# Patient Record
Sex: MALE | Race: WHITE | Employment: OTHER | URBAN - METROPOLITAN AREA
[De-identification: names, ages, dates, MRNs, and addresses within clinical notes are randomized per-mention and may not be internally consistent; named-entity substitution may affect disease eponyms.]

---

## 2018-08-06 ENCOUNTER — APPOINTMENT (OUTPATIENT)
Dept: GENERAL RADIOLOGY | Age: 68
End: 2018-08-06
Payer: MEDICARE

## 2018-08-06 ENCOUNTER — HOSPITAL ENCOUNTER (EMERGENCY)
Age: 68
Discharge: HOME | End: 2018-08-06
Payer: MEDICARE

## 2018-08-06 VITALS
DIASTOLIC BLOOD PRESSURE: 89 MMHG | TEMPERATURE: 98.3 F | OXYGEN SATURATION: 93 % | HEART RATE: 91 BPM | BODY MASS INDEX: 30.13 KG/M2 | RESPIRATION RATE: 18 BRPM | WEIGHT: 210 LBS | SYSTOLIC BLOOD PRESSURE: 156 MMHG

## 2018-08-06 DIAGNOSIS — J20.9 ACUTE BRONCHITIS, UNSPECIFIED ORGANISM: Primary | ICD-10-CM

## 2018-08-06 PROCEDURE — 71046 X-RAY EXAM CHEST 2 VIEWS: CPT

## 2018-08-06 PROCEDURE — 99212 OFFICE O/P EST SF 10 MIN: CPT

## 2018-08-06 RX ORDER — ALBUTEROL SULFATE 90 UG/1
2 AEROSOL, METERED RESPIRATORY (INHALATION) EVERY 6 HOURS PRN
Qty: 1 INHALER | Refills: 0 | Status: SHIPPED | OUTPATIENT
Start: 2018-08-06 | End: 2021-10-29

## 2018-08-06 RX ORDER — PREDNISONE 20 MG/1
TABLET ORAL
Qty: 8 TABLET | Refills: 0 | Status: SHIPPED | OUTPATIENT
Start: 2018-08-06 | End: 2021-10-06

## 2018-08-06 RX ORDER — GUAIFENESIN AND DEXTROMETHORPHAN HYDROBROMIDE 1200; 60 MG/1; MG/1
1 TABLET, EXTENDED RELEASE ORAL EVERY 12 HOURS PRN
Qty: 12 TABLET | Refills: 0 | Status: SHIPPED | OUTPATIENT
Start: 2018-08-06 | End: 2021-10-29

## 2018-08-06 RX ORDER — DOXYCYCLINE 100 MG/1
100 CAPSULE ORAL 2 TIMES DAILY
Qty: 14 CAPSULE | Refills: 0 | Status: SHIPPED | OUTPATIENT
Start: 2018-08-06 | End: 2018-08-13

## 2018-08-06 NOTE — ED PROVIDER NOTES
This is a 62-year-old male that presents to urgent care mostly complaint of chest congestion and a cough for the past 3 days. There's been no fever or chills. Denies abdominal pain nausea vomiting diarrhea urinary symptoms. Denies any swelling in the legs. Review of Systems   Constitutional:        Pertinent positives and negatives are stated within HPI, all other systems reviewed and are negative. Physical Exam   Constitutional: He is oriented to person, place, and time. He appears well-developed and well-nourished. HENT:   Head: Normocephalic and atraumatic. Right Ear: Hearing, tympanic membrane, external ear and ear canal normal.   Left Ear: Hearing, tympanic membrane, external ear and ear canal normal.   Nose: Nose normal. Right sinus exhibits no maxillary sinus tenderness and no frontal sinus tenderness. Left sinus exhibits no maxillary sinus tenderness and no frontal sinus tenderness. Mouth/Throat: Uvula is midline, oropharynx is clear and moist and mucous membranes are normal. No trismus in the jaw. No uvula swelling. Eyes: Conjunctivae, EOM and lids are normal. Pupils are equal, round, and reactive to light. Neck: Normal range of motion. Neck supple. Cardiovascular: Normal rate, regular rhythm and normal heart sounds. No murmur heard. Pulmonary/Chest: Effort normal. He has wheezes (mild expiratory). Abdominal: Soft. Bowel sounds are normal. There is no tenderness. There is no rigidity, no rebound, no guarding and no CVA tenderness. Musculoskeletal: He exhibits no edema. Neurological: He is alert and oriented to person, place, and time. He has normal strength. No cranial nerve deficit or sensory deficit. Coordination and gait normal. GCS eye subscore is 4. GCS verbal subscore is 5. GCS motor subscore is 6. Skin: Skin is warm and dry. No abrasion and no rash noted. Nursing note and vitals reviewed.       Procedures    MDM --------------------------------------------- PAST HISTORY ---------------------------------------------  Past Medical History:  has a past medical history of Abdominal aneurysm ([facility] Atrial fibrillation ([facility] CHF (congestive heart failure) ([facility] History of cardiovascular stress test; Hypertension; Obstructive chronic bronchitis with exacerbation ([facility] Pneumonia; and Tobacco dependence. Past Surgical History:  has a past surgical history that includes Inner ear surgery; Pilonidal cyst excision ([id number]); and Cardiac catheterization (4/17/2014). Social History:  reports that he has quit smoking. His smoking use included Cigarettes. He has a 4.00 pack-year smoking history. He has never used smokeless tobacco. He reports that he does not drink alcohol or use drugs. Family History: family history is not on file. The patients home medications have been reviewed. Allergies: Carvedilol and Augmentin [amoxicillin-pot clavulanate]    -------------------------------------------------- RESULTS -------------------------------------------------  No results found for this visit on 08/06/18. XR CHEST STANDARD (2 VW)   Final Result   No acute cardiopulmonary disease.             ------------------------- NURSING NOTES AND VITALS REVIEWED ---------------------------   The nursing notes within the ED encounter and vital signs as below have been reviewed. BP (!) 156/89   Pulse 91   Temp 98.3 °F (36.8 °C) (Oral)   Resp 18   Wt 210 lb (95.3 kg)   SpO2 93%   BMI 30.13 kg/m²   Oxygen Saturation Interpretation: Normal      ------------------------------------------ PROGRESS NOTES ------------------------------------------   I have spoken with the patient and discussed todays results, in addition to providing specific details for the plan of care and counseling regarding the diagnosis and prognosis.   Their questions are answered at this time and they are agreeable with the

## 2021-08-23 ENCOUNTER — TELEPHONE (OUTPATIENT)
Dept: AUDIOLOGY | Age: 71
End: 2021-08-23

## 2021-08-23 NOTE — TELEPHONE ENCOUNTER
Patient needs to establish care with ENT. Was patient of Dr Clement Youssef.   Contact number:  [phone]

## 2021-09-10 ENCOUNTER — OFFICE VISIT (OUTPATIENT)
Dept: ENT CLINIC | Age: 71
End: 2021-09-10
Payer: MEDICARE

## 2021-09-10 ENCOUNTER — PROCEDURE VISIT (OUTPATIENT)
Dept: AUDIOLOGY | Age: 71
End: 2021-09-10

## 2021-09-10 VITALS
WEIGHT: 207.6 LBS | SYSTOLIC BLOOD PRESSURE: 125 MMHG | DIASTOLIC BLOOD PRESSURE: 81 MMHG | BODY MASS INDEX: 29.72 KG/M2 | HEART RATE: 73 BPM | HEIGHT: 70 IN

## 2021-09-10 DIAGNOSIS — H92.10 OTORRHEA, UNSPECIFIED LATERALITY: ICD-10-CM

## 2021-09-10 DIAGNOSIS — H90.3 BILATERAL SENSORINEURAL HEARING LOSS: ICD-10-CM

## 2021-09-10 DIAGNOSIS — H65.21 RIGHT CHRONIC SEROUS OTITIS MEDIA: Primary | ICD-10-CM

## 2021-09-10 DIAGNOSIS — H65.00 ACUTE SEROUS OTITIS MEDIA, RECURRENCE NOT SPECIFIED, UNSPECIFIED LATERALITY: ICD-10-CM

## 2021-09-10 PROCEDURE — 99024 POSTOP FOLLOW-UP VISIT: CPT | Performed by: AUDIOLOGIST

## 2021-09-10 PROCEDURE — 4040F PNEUMOC VAC/ADMIN/RCVD: CPT | Performed by: OTOLARYNGOLOGY

## 2021-09-10 PROCEDURE — 1036F TOBACCO NON-USER: CPT | Performed by: OTOLARYNGOLOGY

## 2021-09-10 PROCEDURE — G8417 CALC BMI ABV UP PARAM F/U: HCPCS | Performed by: OTOLARYNGOLOGY

## 2021-09-10 PROCEDURE — 99204 OFFICE O/P NEW MOD 45 MIN: CPT | Performed by: OTOLARYNGOLOGY

## 2021-09-10 PROCEDURE — G8427 DOCREV CUR MEDS BY ELIG CLIN: HCPCS | Performed by: OTOLARYNGOLOGY

## 2021-09-10 PROCEDURE — 1123F ACP DISCUSS/DSCN MKR DOCD: CPT | Performed by: OTOLARYNGOLOGY

## 2021-09-10 PROCEDURE — 3017F COLORECTAL CA SCREEN DOC REV: CPT | Performed by: OTOLARYNGOLOGY

## 2021-09-10 RX ORDER — AZELASTINE 1 MG/ML
2 SPRAY, METERED NASAL 2 TIMES DAILY
Qty: 30 ML | Refills: 1 | Status: SHIPPED | OUTPATIENT
Start: 2021-09-10

## 2021-09-10 RX ORDER — CIPROFLOXACIN AND DEXAMETHASONE 3; 1 MG/ML; MG/ML
4 SUSPENSION/ DROPS AURICULAR (OTIC) 2 TIMES DAILY
Qty: 7.5 ML | Refills: 3 | Status: SHIPPED | OUTPATIENT
Start: 2021-09-10 | End: 2021-09-17

## 2021-09-10 NOTE — PROGRESS NOTES
[facility] Otolaryngology  Dr. Dg Helms. Ms.Ed        Patient Name:  Joselin Ray  :  1950     CHIEF C/O:    Chief Complaint   Patient presents with    Ear Problem     right ear bleeding - plugged for 1.5 months  and today . was given antibiotic and also cleared today all plugged up again. it's the only good ear        HISTORY OBTAINED FROM:  patient    HISTORY OF PRESENT ILLNESS:       Jamaica Haq is a 66y.o. 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 Patient had some leftover drops from his previous ENT that he has attempted to use, without any success. Patient denies any room spinning vertigo. No complaints of nasal congestion, recent history of fever chills sore throat difficulty swallowing at this time.       Past Medical History:   Diagnosis Date    Abdominal aneurysm ([address]     Atrial fibrillation (HCC)     CHF [facility]congestive heart failure) ([facility]) Echo 2/15/14     EF 97% prob diastolic    History of cardiovascular stress test 14    lexiscan    Hypertension     Obstructive chronic bronchitis with exacerbation ([address] 2014    Pneumonia     Tobacco dependence 4/10/2014     Past Surgical History:   Procedure Laterality Date    CARDIAC CATHETERIZATION  2014    By Dr. Fernandez Mayer  's-'s       Current Outpatient Medications:     ciprofloxacin-dexamethasone (CIPRODEX) 0.3-0.1 % otic suspension, Place 4 drops in ear(s) 2 times daily for 7 days, Disp: 7.5 mL, Augmentin [amoxicillin-pot clavulanate]  Social History     Tobacco Use    Smoking status: Former Smoker     Packs/day: 0.10     Years: 40.00     Pack years: 4.00     Types: Cigarettes    Smokeless tobacco: Never Used    Tobacco comment: occassionally a cigarette   Substance Use Topics    Alcohol use: Yes     Alcohol/week: 2.0 standard drinks     Types: 2 Cans of beer per week     Comment: maybe 2-3 beers a year    Drug use: No     No family history on file. Review of Systems   Constitutional: Negative for chills and fever. HENT: Positive for ear discharge, ear pain and hearing loss. Negative for congestion, rhinorrhea, sinus pressure, sinus pain and sneezing. Respiratory: Negative for cough and shortness of breath. Cardiovascular: Negative for chest pain and palpitations. Gastrointestinal: Negative for vomiting. Skin: Negative for rash. Allergic/Immunologic: Negative for environmental allergies. Neurological: Negative for dizziness and headaches. Hematological: Does not bruise/bleed easily. All other systems reviewed and are negative. /81 (Site: Left Upper Arm, Position: Sitting, Cuff Size: Large Adult)   Pulse 73   Ht 5' 10\" (1.778 m)   Wt 207 lb 9.6 oz (94.2 kg)   BMI 29.79 kg/m²   Physical Exam  Vitals and nursing note reviewed. Constitutional:       Appearance: He is well-developed. HENT:      Head: Normocephalic and atraumatic. Right Ear: Decreased hearing noted. A middle ear effusion is present. A PE tube is present. Tympanic membrane is perforated. Left Ear: No PE tube. Tympanic membrane is not retracted or bulging. Tympanic membrane has normal mobility. Eyes:      Pupils: Pupils are equal, round, and reactive to light. Neck:      Thyroid: No thyromegaly. Trachea: No tracheal deviation. Cardiovascular:      Rate and Rhythm: Normal rate. Pulmonary:      Effort: Pulmonary effort is normal. No respiratory distress.    Musculoskeletal:

## 2021-09-12 ASSESSMENT — ENCOUNTER SYMPTOMS
VOMITING: 0
COUGH: 0
RHINORRHEA: 0
SINUS PAIN: 0
SINUS PRESSURE: 0
SHORTNESS OF BREATH: 0

## 2021-09-24 ENCOUNTER — OFFICE VISIT (OUTPATIENT)
Dept: ENT CLINIC | Age: 71
End: 2021-09-24
Payer: MEDICARE

## 2021-09-24 ENCOUNTER — PROCEDURE VISIT (OUTPATIENT)
Dept: AUDIOLOGY | Age: 71
End: 2021-09-24
Payer: MEDICARE

## 2021-09-24 VITALS
SYSTOLIC BLOOD PRESSURE: 135 MMHG | WEIGHT: 207 LBS | HEIGHT: 70 IN | BODY MASS INDEX: 29.63 KG/M2 | DIASTOLIC BLOOD PRESSURE: 78 MMHG

## 2021-09-24 DIAGNOSIS — H65.90 FLUID COLLECTION OF MIDDLE EAR: ICD-10-CM

## 2021-09-24 DIAGNOSIS — H72.01 CENTRAL PERFORATION OF TYMPANIC MEMBRANE OF RIGHT EAR: Primary | ICD-10-CM

## 2021-09-24 PROCEDURE — 1036F TOBACCO NON-USER: CPT | Performed by: OTOLARYNGOLOGY

## 2021-09-24 PROCEDURE — 1123F ACP DISCUSS/DSCN MKR DOCD: CPT | Performed by: OTOLARYNGOLOGY

## 2021-09-24 PROCEDURE — G8427 DOCREV CUR MEDS BY ELIG CLIN: HCPCS | Performed by: OTOLARYNGOLOGY

## 2021-09-24 PROCEDURE — 4040F PNEUMOC VAC/ADMIN/RCVD: CPT | Performed by: OTOLARYNGOLOGY

## 2021-09-24 PROCEDURE — 3017F COLORECTAL CA SCREEN DOC REV: CPT | Performed by: OTOLARYNGOLOGY

## 2021-09-24 PROCEDURE — 99213 OFFICE O/P EST LOW 20 MIN: CPT | Performed by: OTOLARYNGOLOGY

## 2021-09-24 PROCEDURE — G8417 CALC BMI ABV UP PARAM F/U: HCPCS | Performed by: OTOLARYNGOLOGY

## 2021-09-24 PROCEDURE — 92567 TYMPANOMETRY: CPT | Performed by: AUDIOLOGIST

## 2021-09-24 ASSESSMENT — ENCOUNTER SYMPTOMS
COUGH: 0
RHINORRHEA: 0
SINUS PAIN: 0
SINUS PRESSURE: 0
SHORTNESS OF BREATH: 0
VOMITING: 0

## 2021-09-24 NOTE — PROGRESS NOTES
[facility] Otolaryngology  Dr. Amber Yepez. Rajesh Mcnally. Ms.Ed        Patient Name:  Tyree Porter  :  1950     CHIEF C/O:    Chief Complaint   Patient presents with    Follow-up     2 wk ETD       HISTORY OBTAINED FROM:  patient    HISTORY OF PRESENT ILLNESS:       Ventura Sullivan is a 66y.o. 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 Patient had some leftover drops from his previous ENT that he has attempted to use, without any success. Patient denies any room spinning vertigo. No complaints of nasal congestion, recent history of fever chills sore throat difficulty swallowing at this time. 21: here for follow up after being placed on drops for the right ear. Feeling much better denies drainage or pain on that side.        Past Medical History:   Diagnosis Date    Abdominal aneurysm ([address]     Atrial fibrillation (HCC)     CHF (congestive heart failure) (HCC) Echo 2/15/14     EF 92% prob diastolic    History of cardiovascular stress test 14    lexiscan    Hypertension     Obstructive chronic bronchitis with exacerbation ([address] 2014    Pneumonia     Tobacco dependence 4/10/2014     Past Surgical History:   Procedure Laterality Date    CARDIAC CATHETERIZATION  2014    By Dr. Cabrera Levels  's-       Current Outpatient Medications:     chlorthalidone (HYGROTON) 25 MG tablet, Take 25 mg by mouth daily, Disp: , Rfl:     Multiple Vitamins-Minerals (THERAPEUTIC MULTIVITAMIN-MINERALS) tablet, Take 1 tablet by mouth daily, Disp: , Rfl:     warfarin (COUMADIN) 3 MG tablet, Take 3 mg by mouth daily Takes 3 mg for two days, then 1.5 mg the next day. Then he takes 3 mg for two more days then takes 1.5 mg again. Took 3 mg on 4/22/17 so he is due to take 3 mg tonight 4/23/17 then 1.5 mg on 4/24/17., Disp: , Rfl:     furosemide (LASIX) 20 MG tablet, Take 1 tablet by mouth daily. (Patient taking differently:  Take 20 mg by mouth daily as needed ), Disp: 60 tablet, Rfl: 1    azelastine (ASTELIN) 0.1 % nasal spray, 2 sprays by Nasal route 2 times daily Use in each nostril as directed (Patient not taking: Reported on 9/24/2021), Disp: 30 mL, Rfl: 1    albuterol sulfate HFA (PROVENTIL HFA) 108 (90 Base) MCG/ACT inhaler, Inhale 2 puffs into the lungs every 6 hours as needed for Wheezing (Patient not taking: Reported on 9/24/2021), Disp: 1 Inhaler, Rfl: 0    predniSONE (DELTASONE) 20 MG tablet, Take 2 tablets by mouth daily x 4 days. (Patient not taking: Reported on 9/24/2021), Disp: 8 tablet, Rfl: 0    Dextromethorphan-Guaifenesin (MUCINEX DM MAXIMUM STRENGTH)  MG TB12, Take 1 tablet by mouth every 12 hours as needed (cough) (Patient not taking: Reported on 9/24/2021), Disp: 12 tablet, Rfl: 0    loratadine (CLARITIN) 10 MG tablet, Take 10 mg by mouth daily as needed (Patient not taking: Reported on 9/24/2021), Disp: , Rfl:     atorvastatin (LIPITOR) 40 MG tablet, Take 40 mg by mouth daily (Patient not taking: Reported on 9/24/2021), Disp: , Rfl:     metoprolol (TOPROL-XL) 25 MG XL tablet, Take 25 mg by mouth daily (Patient not taking: Reported on 9/24/2021), Disp: , Rfl:     aspirin 81 MG tablet, Take 81 mg by mouth daily. (Patient not taking: Reported on 9/24/2021), Disp: , Rfl:     budesonide (PULMICORT) 0.5 MG/2ML nebulizer suspension, Take 2 mLs by nebulization 2 times daily.  (Patient not taking: Reported on 9/24/2021), Disp: 60 ampule, Rfl: 3    furosemide (LASIX) 20 MG tablet, Take 1 tablet by mouth daily. (Patient not taking: Reported on 9/24/2021), Disp: 30 tablet, Rfl: 6    lisinopril (PRINIVIL;ZESTRIL) 5 MG tablet, Take 2 tablets by mouth daily. (Patient not taking: Reported on 9/24/2021), Disp: 30 tablet, Rfl: 1  Carvedilol and Augmentin [amoxicillin-pot clavulanate]  Social History     Tobacco Use    Smoking status: Former Smoker     Packs/day: 0.10     Years: 40.00     Pack years: 4.00     Types: Cigarettes    Smokeless tobacco: Never Used    Tobacco comment: occassionally a cigarette   Substance Use Topics    Alcohol use: Yes     Alcohol/week: 2.0 standard drinks     Types: 2 Cans of beer per week     Comment: maybe 2-3 beers a year    Drug use: No     No family history on file. Review of Systems   Constitutional: Negative for chills and fever. HENT: Positive for ear discharge, ear pain and hearing loss. Negative for congestion, rhinorrhea, sinus pressure, sinus pain and sneezing. Respiratory: Negative for cough and shortness of breath. Cardiovascular: Negative for chest pain and palpitations. Gastrointestinal: Negative for vomiting. Skin: Negative for rash. Allergic/Immunologic: Negative for environmental allergies. Neurological: Negative for dizziness and headaches. Hematological: Does not bruise/bleed easily. All other systems reviewed and are negative. /78   Ht 5' 10\" (1.778 m)   Wt 207 lb (93.9 kg)   BMI 29.70 kg/m²   Physical Exam  Vitals and nursing note reviewed. Constitutional:       Appearance: He is well-developed. HENT:      Head: Normocephalic and atraumatic. Right Ear: Decreased hearing noted. No middle ear effusion. A PE tube is present. Tympanic membrane is perforated. Left Ear: No PE tube. Tympanic membrane is not retracted or bulging. Tympanic membrane has normal mobility. Eyes:      Pupils: Pupils are equal, round, and reactive to light. Neck:      Thyroid: No thyromegaly.       Trachea: No tracheal deviation. Cardiovascular:      Rate and Rhythm: Normal rate. Pulmonary:      Effort: Pulmonary effort is normal. No respiratory distress. Musculoskeletal:         General: Normal range of motion. Cervical back: Normal range of motion. Lymphadenopathy:      Cervical: No cervical adenopathy. Skin:     General: Skin is warm. Findings: No erythema. Neurological:      Mental Status: He is alert. Cranial Nerves: No cranial nerve deficit. IMPRESSION/PLAN:  Patient seen and examined, right T-tube is in place although the perforation of the ear is greater than the diameter of the T-tube the flanges are intact in the middle ear, concern for possible cholesteatoma. Ear has dried up since being placed on the drops for two weeks. Will order CT IAC and refer to Dr. Dewey Mar for further evaluation. Electronically signed by Laura Latham DO on 9/24/2021 at 11:25 AM    Dr. Gisel Hurley.  Otolaryngology Facial Plastic Surgery  :  [facility] Otolaryngology/Facial Plastic Surgery Residency  Associate Clinical Professor:  Silverio George, [address] [place] [address]  1950      I have discussed the case, including pertinent history and exam findings with the resident. I have seen and examined the patient and the key elements of the encounter have been performed by me. I agree with the assessment, plan and orders as documented by the resident. Patient here for follow up of medical problems. Remainder of medical problems as per resident note.       [facility], DO  9/29/21

## 2021-09-24 NOTE — PROGRESS NOTES
This patient was referred for tympanometric testing by Dr. Padmini Mejia due to chronic middle ear pathologies. Patient has a chronic TM perforation, left ear. Tympanometry revealed a rising tympanogram, with significant negative middle ear peak pressure, right ear and a flat tympanogram, with a large ear canal volume, left ear. The results were reviewed with the patient. Recommendations for follow up will be made pending physician consult.     Argenis Jordan CCC/PHILLIP  Audiologist  [id number]  NPI#:  [id number]

## 2021-10-04 ENCOUNTER — TELEPHONE (OUTPATIENT)
Dept: ENT CLINIC | Age: 71
End: 2021-10-04

## 2021-10-04 NOTE — TELEPHONE ENCOUNTER
Patient had CT done on 9/30/21 we are to call with results, patient has no future appt's    Please advise

## 2021-10-06 ENCOUNTER — PROCEDURE VISIT (OUTPATIENT)
Dept: AUDIOLOGY | Age: 71
End: 2021-10-06
Payer: MEDICARE

## 2021-10-06 ENCOUNTER — OFFICE VISIT (OUTPATIENT)
Dept: ENT CLINIC | Age: 71
End: 2021-10-06
Payer: MEDICARE

## 2021-10-06 VITALS
HEART RATE: 66 BPM | RESPIRATION RATE: 14 BRPM | SYSTOLIC BLOOD PRESSURE: 146 MMHG | WEIGHT: 207 LBS | OXYGEN SATURATION: 97 % | HEIGHT: 70 IN | BODY MASS INDEX: 29.63 KG/M2 | DIASTOLIC BLOOD PRESSURE: 84 MMHG

## 2021-10-06 DIAGNOSIS — Z96.22 RETAINED MYRINGOTOMY TUBE IN RIGHT EAR: ICD-10-CM

## 2021-10-06 DIAGNOSIS — H90.3 SENSORINEURAL HEARING LOSS (SNHL), BILATERAL: ICD-10-CM

## 2021-10-06 DIAGNOSIS — H90.A22 SENSORINEURAL HEARING LOSS (SNHL) OF LEFT EAR WITH RESTRICTED HEARING OF RIGHT EAR: Primary | ICD-10-CM

## 2021-10-06 DIAGNOSIS — H71.02 CHOLESTEATOMA OF ATTIC OF EAR, LEFT: Primary | ICD-10-CM

## 2021-10-06 PROCEDURE — 92626 EVAL AUD FUNCJ 1ST HOUR: CPT | Performed by: AUDIOLOGIST

## 2021-10-06 PROCEDURE — 92567 TYMPANOMETRY: CPT | Performed by: AUDIOLOGIST

## 2021-10-06 PROCEDURE — 99215 OFFICE O/P EST HI 40 MIN: CPT | Performed by: OTOLARYNGOLOGY

## 2021-10-06 NOTE — PATIENT INSTRUCTIONS
COVID pre op instruction sheet given to patient. SURGERY:_____/_____/_____    Nothing to eat or drink after midnight the night before surgery unless surgery is at [facility] or otherwise instructed by the hospital.    DO NOT TAKE ANY ASPIRIN PRODUCTS 7 days prior to surgery. Tylenol only. No Advil, Motrin, Aleve, or Ibuprofen. Any illegal drugs in your system (including Marijuana even if legally prescribed) will result in your surgery being cancelled. Please be sure to check with our office or the hospital on time frame for the drugs to be out of your system. SHOULD YOUR INSURANCE CHANGE AT ANY TIME YOU MUST CONTACT OUR OFFICE. FAILURE TO DO SO MAY RESULT IN YOUR SURGERY BEING RESCHEDULED OR YOU MAY BE CHARGED AS SELF-PAY. Due to the high demand for surgery at our practice, if you cancel or reschedule your surgery two (2) times we may not reschedule you. If you need FMLA or Short Term Disability paperwork completed for your surgery, please complete your portion, ensure your name and date of birth are on them and fax them to [phone] asap. Paperwork can take up to 2 weeks to be completed. Per current hospital protocols, you will be contacted within 1 week of your surgery date with instructions to complete COVID-19 testing. COVID testing is no longer required as long as you are FULLY vaccinated (14 days AFTER 2nd vaccination). You must present your vaccination card at time of surgery, failure to do so will prompt a rapid COVID test prior to your surgery. If you need medical clearance, you are responsible to contact your physician(s) to schedule the appointment. If clearance is not completed within 30 days of your surgery it may be cancelled. Our office will fax the appropriate forms that need to be completed to your physician(s). The location of your surgery will call you the day prior to your surgery date to let you know what time you have to be there and any other details.     · [facility], [facility], [facility] will call you a couple days prior to surgery and give you further instructions, if you have any questions, you can reach them at [phone]    ·       [address], [address], [place]OYA COLLINS [facility], [facility] will call you a couple days prior to surgery and give you further instructions, if you have any questions, you can reach them at [phone]    ·       [facility]), [address],  LATOYA COLLINS [facility] - [facility], [facility] will call you a couple days prior to surgery and give you further instructions, if you have any questions, you can reach them at [phone]    ·       [facility], [facility] [address] will call you a couple days prior to surgery and give you further instructions, if you have any questions, you can reach them at [phone]         Pre-Surgery/Anesthesia Video ([address] on [facility]:   1. Scroll over Health Information   2. Select Audio and Video   3. Select [employer]   4. Select Your child and Anesthesia   5.  Select Pre surgery [facility]      FOOD RESTRICTIONS--[facility] ONLY    Solid Food/Milk Products --------- Stop 8 hours prior to Surgery    Formula --------- Stop 6 hours prior to Surgery    Breast Milk ------- Stop 4 hours prior to Surgery    Clear liquids (water,Gatorade,Pedialyte) - Stop 2 hours prior to Surgery    I HAVE RECEIVED A COPY OF MY SURGERY INSTRUCTIONS AND WILL CONTACT THE OFFICE IF THERE SHOULD BE ANY CHANGES TO MY INFORMATION  Signature: __________________________________ Date: ____/____/____

## 2021-10-06 NOTE — PROGRESS NOTES
Patient was referred by Dr. Ed Mauricio for audiometric testing. He reported bilateral hearing loss. Patient reported a history of hearing aid use, 10 years ago. He reported a history of ear surgeries. He also reported increased hearing loss. He denied any current ear drainage. Audiometry revealed an essentially moderate steeply sloping to profound sensorineural hearing loss, in the right ear and a severe-profound sensorineural hearing loss, in the left ear. Reliability was good. Speech reception thresholds were in good agreement with the pure tone averages, bilaterally. Speech discrimination scores were excellent, in the right ear and poor (0%), in the left ear. Tympanometry revealed flat tympanograms, in the right ear and a shallow tympanogram, in the left ear. Patient was fit with programmed [email or web address] hearing aids and aided speech testing was performed in the sound field. [place] C[employer] scores were  69%, binaurally. [employer] scores in quiet were 72%, binaurally. In +10 dB SNR, scores were 51%, binaurally. The results were reviewed with the patient and Dr. Ed Mauricio. Discussed hearing aids. Patient will call insurance to check benefits and will follow up after. Ear mold impressions taken bilaterally without incident. Niraj Allen, [address] [place] [address] [facility]   Electronically signed by Sneha العلي on 10/6/2021 at 4:33 PM      Note: At least 35 minutes spent face to face with patient collecting case history, performing tests, and reviewing results.

## 2021-10-06 NOTE — TELEPHONE ENCOUNTER
Patient will follow-up with Dr. Cira Lo, she can go over the details of the CT scan no changes for me at this point

## 2021-10-06 NOTE — PROGRESS NOTES
NEW PATIENT VISIT  Chief Complaint   Patient presents with    Ear Problem     ANNETTA ref. Left side limited hearing. Right side ETD,poss. perforation  inner ear surgery 1995      History of Present Illness:     Caro Perez is a 66 y.o. male presenting who had surgery in the 1990s for the left ear for left ear disease (TM repair and ossiculoplaty); used to have a left hearing aid and did not have much benefit from his left hearing aid; he started to have issues with his right ear hearing started about 2010, and had a tubes in the right ear (2X by Dr. Hanna Negron)  He states he is having issues with volume at home; having a tough time with his hearing   History of afib and open heart surgery; aortic aneursym; on coumadin  Used to work with loud machines                      TOBACCO  Social History     Tobacco Use   Smoking Status Former Smoker    Packs/day: 0.10    Years: 40.00    Pack years: 4.00    Types: Cigarettes   Smokeless Tobacco Never Used   Tobacco Comment    occassionally a cigarette        ALCOHOL   Social History     Substance and Sexual Activity   Alcohol Use Yes    Alcohol/week: 2.0 standard drinks    Types: 2 Cans of beer per week    Comment: maybe 2-3 beers a year        [address]   Social History     Substance and Sexual Activity   Drug Use No        CURRENT OUTPATIENT MEDICATIONS:   Outpatient Medications Marked as Taking for the 10/6/21 encounter (Office Visit) with Bryant Galicia MD   Medication Sig Dispense Refill    loratadine (CLARITIN) 10 MG tablet Take 10 mg by mouth daily as needed       chlorthalidone (HYGROTON) 25 MG tablet Take 25 mg by mouth daily      Multiple Vitamins-Minerals (THERAPEUTIC MULTIVITAMIN-MINERALS) tablet Take 1 tablet by mouth daily      warfarin (COUMADIN) 3 MG tablet Take 3 mg by mouth daily Takes 3 mg for two days, then 1.5 mg the next day. Then he takes 3 mg for two more days then takes 1.5 mg again.  Took 3 mg on 4/22/17 so he is due to take 3 mg tonight 4/23/17 then 1.5 mg on 4/24/17. ALLERGIES:   Allergies   Allergen Reactions    Carvedilol Other (See Comments) and Shortness Of Breath    Augmentin [Amoxicillin-Pot Clavulanate] Nausea Only and Other (See Comments)     nausea       Timing Age of Onset 1990s   Duration Increasing in Severity Yes   Days of work missed in last year n/a      Modifying Factors Seasonal variation No        Associated Symptoms Mouth breathing No    Communication concerns Yes    Halitosis No     Family History Family members with similar conditions No   Family history of bleeding concerns No   Family history of anesthia concerns No        Review of Symptoms:  I have reviewed the patient's medical history in detail; there are no changes to the history as noted in the electronic medical record. BP (!) 146/84 (Site: Left Upper Arm, Position: Sitting, Cuff Size: Medium Adult)   Pulse 66   Resp 14   Ht 5' 10\" (1.778 m)   Wt 207 lb (93.9 kg)   SpO2 97%   BMI 29.70 kg/m²     Physical Exam    Allergies Allergies   Allergen Reactions    Carvedilol Other (See Comments) and Shortness Of Breath    Augmentin [Amoxicillin-Pot Clavulanate] Nausea Only and Other (See Comments)     nausea      Constitutional Retractions/cyanosis {No     Head and Face   Lesions or masses No  facies symmetrical Yes   Eyes Ocular motion with gaze alignment Yes   Ears Inspection: Scars, lesions or masses No   Otoscopy  EAC patent bilaterally without occlusion External ears normal. Canals clear.  Right TM with patent T-tube with perforation around the t-tube; left well healed postauricular incision with thickened and retracted TM with possible recurrent cholesteatoma      Nasal Inspection    No external Scars, lesions or masses    Pyriform apertures widely patent    Nasal musosa healthy   Septum Midline, no Septal Perforation, no septal hematoma   Turbinates Intact, healthy    Oral Cavity Lips no lesions    Teeth healthy without cavities    Gums no lesions Oral mucosa healthy    Hard and Soft Palate no lesions    Uvula single fid     Tongue no lesions    Tonsils normal size Symmetric without exudate   Neck . Neck supple without tenderness or crepitus   Lymph  Cranial Nerve exam No palpable adenopathy  Grossly intact. CN VII symmetrical   Respiration Symmetric without Increased work of breathing    Cardiovacular No Cyanosis    Skin healthy   Diagnostics    Test ordered No orders of the defined types were placed in this encounter. Review of existing tests Lab Results   Component Value Date/Time    WBC 13.7 (H) 04/24/2017 05:53 AM    HGB 15.5 04/24/2017 05:53 AM    HCT 45.0 04/24/2017 05:53 AM     04/24/2017 05:53 AM    MCV 97.2 04/24/2017 05:53 AM    MCH 33.5 04/24/2017 05:53 AM    MCHC 34.4 04/24/2017 05:53 AM    RDW 13.3 04/24/2017 05:53 AM    NEUTOPHILPCT 52.5 04/24/2017 05:53 AM    LYMPHOPCT 36.1 04/24/2017 05:53 AM    MONOPCT 7.0 04/24/2017 05:53 AM    EOSRELPCT 2.9 04/24/2017 05:53 AM    BASOPCT 0.3 04/24/2017 05:53 AM    NEUTROABS 7.18 04/24/2017 05:53 AM    LYMPHSABS 4.93 (H) 04/24/2017 05:53 AM    EOSABS 0.40 04/24/2017 05:53 AM        Old records  Reviewed   Discussion with other providers    Done     On this date 10/6/2021 I have spent 15 minutes reviewing previous notes, test results and 45 minutes face to face with the patient discussing the diagnosis and importance of compliance with the treatment plan as well as documenting on the day of the visit.     A/P    Impression / Plan:     Jai Harris is a 66 y.o.  male with left cholesteatoma and right perforation with retained ear tube and left profound SNHL and right mixed hearing loss confirmed by audiogram and examination, who will benefit from left tympanoplasty possible tympanoplasty (no OCR due to profound loss) and right tube removal. The rest of the exam was benign      Patient will benefit from hearing aids regardless of surgery   Risks and benefits discussed with family including persistent perforation, chronic ear disease, infection, pain, scarring, facial paralysis, hearing loss, recurrent disease   Patient will need an audiogram once healed  PCP and cardiology clearance   Old records were reviewed     Bryant Galicia MD 10/6/21 10:28 AM EDT

## 2021-10-06 NOTE — LETTER
[facility]  Celestina Beach MD  Otolaryngology  [address], [address] [place] [address]  Ph: [phone] (direct number [phone])   Fax: [phone]        2021      Dr. Molina Lakeishanighat,    Patient Lucy Grey : [birth year]  is scheduled for Left tympanoplasty possible mastoidectomy on . Patient will need medical pre op clearance due to him taking ASA and Coumadin. Please advise. Sincerely,      Carole Wayne. CELESTE Murguia/ Dr. Celestina Beach

## 2021-10-12 ENCOUNTER — TELEPHONE (OUTPATIENT)
Dept: ENT CLINIC | Age: 71
End: 2021-10-12

## 2021-10-13 ENCOUNTER — TELEPHONE (OUTPATIENT)
Dept: ENT CLINIC | Age: 71
End: 2021-10-13

## 2021-10-13 NOTE — TELEPHONE ENCOUNTER
Spoke with patients pcp office, dr. Rizwana Tillman. He is scheduled for a Right tympanoplasty on 11/1 with Dr. Gael Walters. Needs medical clearance due to he is taking ASA and Coumadin. They asked that a letter be faxed to them stating what we need and will fax back once physician reviews. Fax confirmation scanned.

## 2021-10-26 ENCOUNTER — TELEPHONE (OUTPATIENT)
Dept: ENT CLINIC | Age: 71
End: 2021-10-26

## 2021-10-26 NOTE — TELEPHONE ENCOUNTER
Called back to PCP office, spoke with Mallorie Reveles, patient was seen in office on 10/22/21 for clearance, note is not completed as of today. Fax number given to fax for PAT purpose. Per Mallorie Reveles, verbally instructed that patient is to stop Coumadin and asa starting Wednesday 10/27/21. Notified patient with instructions as well, voiced understanding.

## 2021-10-26 NOTE — TELEPHONE ENCOUNTER
Patient LVM requesting return call regarding surgical questions. Please advise.     Electronically signed by Areli Way on 10/26/21 at 9:13 AM EDT

## 2021-10-27 ENCOUNTER — HOSPITAL ENCOUNTER (OUTPATIENT)
Age: 71
Discharge: HOME | End: 2021-10-29
Payer: MEDICARE

## 2021-10-27 DIAGNOSIS — Z01.818 PREOP TESTING: ICD-10-CM

## 2021-10-27 PROCEDURE — U0005 INFEC AGEN DETEC AMPLI PROBE: HCPCS

## 2021-10-27 PROCEDURE — U0003 INFECTIOUS AGENT DETECTION BY NUCLEIC ACID (DNA OR RNA); SEVERE ACUTE RESPIRATORY SYNDROME CORONAVIRUS 2 (SARS-COV-2) (CORONAVIRUS DISEASE [COVID-19]), AMPLIFIED PROBE TECHNIQUE, MAKING USE OF HIGH THROUGHPUT TECHNOLOGIES AS DESCRIBED BY CMS-2020-01-R: HCPCS

## 2021-10-28 LAB — SARS-COV-2, PCR: NOT DETECTED

## 2021-10-29 NOTE — PROGRESS NOTES
[facility] PRE-ADMISSION TESTING INSTRUCTIONS    The Preadmission Testing patient is instructed accordingly using the following criteria (check applicable):    ARRIVAL INSTRUCTIONS:  [x] Parking the day of Surgery is located in the Main Entrance lot. Upon entering the door, make an immediate right to the surgery reception desk    [x] Bring photo ID and insurance card    [] Bring in a copy of Living will or Durable Power of  papers. [x] Please be sure to arrange for responsible adult to provide transportation to and from the hospital    [x] Please arrange for responsible adult to be with you for the 24 hour period post procedure due to having anesthesia      GENERAL INSTRUCTIONS:    [x] Nothing by mouth after midnight, including gum, candy, mints or water    [x] You may brush your teeth, but do not swallow any water    [] Take medications as instructed with 1-2 oz of water    [x] Stop herbal supplements and vitamins 5 days prior to procedure    [x] Follow preop dosing of blood thinners per physician instructions    [] Take 1/2 dose of evening insulin, but no insulin after midnight    [] No oral diabetic medications after midnight    [] If diabetic and have low blood sugar or feel symptomatic, take 1-2oz apple juice only    [] Bring inhalers day of surgery    [] Bring C-PAP/ Bi-Pap day of surgery    [] Bring urine specimen day of surgery    [x] Shower or bath with soap, lather and rinse well, AM of Surgery, no lotion, powders or creams    [] Follow bowel prep as instructed per surgeon    [x] No tobacco products within 24 hours of surgery     [x] No alcohol or illegal drug use within 24 hours of surgery.     [x] Jewelry, body piercing's, eyeglasses, contact lenses and dentures are not permitted into surgery (bring cases)      [] Please do not wear any nail polish, make up or hair products on the day of surgery    [x] You can expect a call the business day prior to procedure to notify you if your arrival time changes    [x] If you receive a survey after surgery we would greatly appreciate your comments    [] Parent/guardian of a minor must accompany their child and remain on the premises  the entire time they are under our care     [] Pediatric patients may bring favorite toy, blanket or comfort item with them    [] A caregiver or family member must remain with the patient during their stay if they are mentally handicapped, have dementia, disoriented or unable to use a call light or would be a safety concern if left unattended    [x] Please notify surgeon if you develop any illness between now and time of surgery (cold, cough, sore throat, fever, nausea, vomiting) or any signs of infections  including skin, wounds, and dental.    [x]  The Outpatient Pharmacy is available to fill your prescription here on your day of surgery, ask your preop nurse for details    [x] Other instructions: wear loose, comfortable clothing  EDUCATIONAL MATERIALS PROVIDED:    [] PAT Preoperative Education Packet/Booklet     [] Medication List    [] Transfusion bracelet applied with instructions    [] Shower with soap, lather and rinse well, and use CHG wipes provided the evening before surgery as instructed    [] Incentive spirometer with instructions

## 2021-10-29 NOTE — PROGRESS NOTES
Attempted to recall Dr. Pradeep Gardner office for clearance/EKGfor surgery several times again, thee office was at lunch from [id number]. Then, called after 1330 x3 times unsuccessfully reached busy signal on phone.

## 2021-10-29 NOTE — PROGRESS NOTES
Spoke with Dr. Rancho Limon office checking on pts EKG, clearance for DOS 10/29/62462. They stated they have called pt PCP several times for these documents and have been unsuccessful. They are retrying to call today for the needed documents for surgery.

## 2021-10-29 NOTE — PROGRESS NOTES
Have you been tested for COVID  Yes  negative         Have you been told you were positive for COVID No  Have you had any known exposure to someone that is positive for COVID No  Do you have a cough                   No              Do you have shortness of breath No                 Do you have a sore throat            No                Are you having chills                    No                Are you having muscle aches. No                    Please come to the hospital wearing a mask and have your significant other wear a mask as well. Both of you should check your temperature before leaving to come here,  if it is 100 or higher please call [phone] for instruction.

## 2021-10-29 NOTE — PROGRESS NOTES
Dr. Satinder James office called back and stated they spoke with Dr. Mitchell Medellin requesting documents to include EKG, clearance for surgery. Dr. Mitchell Medellin office states they have not been dictated as of yet. They were informed the pt is for surgery Monday 11/1/2021 and they are needed today. They stated they are busy and short handed and will try to get it done today. She stressed to them it is needed today or pts surgery will be cancelled 11/1/2021. she was told to call back this afternoon. I attempted to call Dr. Mitchell Medellin office also, and the line has been busy each time I call x4.

## 2021-10-29 NOTE — PROGRESS NOTES
Dr. Maryam Juan office called in checking if we received clearance for surgery . . they were notified we have not. Marielos Washington

## 2021-10-31 NOTE — H&P
NEW PATIENT VISIT       Chief Complaint   Patient presents with    Ear Problem       Alivia Getting ref. Left side limited hearing. Right side ETD,poss. perforation  inner ear surgery 1995       History of Present Illness:      Martha Mayen is a 66 y.o. male presenting who had surgery in the 1990s for the left ear for left ear disease (TM repair and ossiculoplaty); used to have a left hearing aid and did not have much benefit from his left hearing aid; he started to have issues with his right ear hearing started about 2010, and had a tubes in the right ear (2X by Dr. Margarita Nguyễn)  He states he is having issues with volume at home; having a tough time with his hearing   History of afib and open heart surgery; aortic aneursym; on coumadin  Used to work with loud machines                            TOBACCO  Social History           Tobacco Use   Smoking Status Former Smoker    Packs/day: 0.10    Years: 40.00    Pack years: 4.00    Types: Cigarettes   Smokeless Tobacco Never Used   Tobacco Comment     occassionally a cigarette         ALCOHOL   Social History           Substance and Sexual Activity   Alcohol Use Yes    Alcohol/week: 2.0 standard drinks    Types: 2 Cans of beer per week     Comment: maybe 2-3 beers a year         [address]   Social History          Substance and Sexual Activity   Drug Use No         CURRENT OUTPATIENT MEDICATIONS:   Active Medications   Outpatient Medications Marked as Taking for the 10/6/21 encounter (Office Visit) with Travis Rogers MD   Medication Sig Dispense Refill    loratadine (CLARITIN) 10 MG tablet Take 10 mg by mouth daily as needed         chlorthalidone (HYGROTON) 25 MG tablet Take 25 mg by mouth daily        Multiple Vitamins-Minerals (THERAPEUTIC MULTIVITAMIN-MINERALS) tablet Take 1 tablet by mouth daily        warfarin (COUMADIN) 3 MG tablet Take 3 mg by mouth daily Takes 3 mg for two days, then 1.5 mg the next day. Then he takes 3 mg for two more days then takes 1.5 mg again. Took 3 mg on 4/22/17 so he is due to take 3 mg tonight 4/23/17 then 1.5 mg on 4/24/17.                ALLERGIES:         Allergies   Allergen Reactions    Carvedilol Other (See Comments) and Shortness Of Breath    Augmentin [Amoxicillin-Pot Clavulanate] Nausea Only and Other (See Comments)       nausea         Timing Age of Onset 1990s   Duration Increasing in Severity Yes   Days of work missed in last year n/a      Modifying Factors Seasonal variation No         Associated Symptoms Mouth breathing No     Communication concerns Yes     Halitosis No      Family History Family members with similar conditions No   Family history of bleeding concerns No   Family history of anesthia concerns No         Review of Symptoms:  I have reviewed the patient's medical history in detail; there are no changes to the history as noted in the electronic medical record.         BP (!) 146/84 (Site: Left Upper Arm, Position: Sitting, Cuff Size: Medium Adult)   Pulse 66   Resp 14   Ht 5' 10\" (1.778 m)   Wt 207 lb (93.9 kg)   SpO2 97%   BMI 29.70 kg/m²      Physical Exam     Allergies       Allergies   Allergen Reactions    Carvedilol Other (See Comments) and Shortness Of Breath    Augmentin [Amoxicillin-Pot Clavulanate] Nausea Only and Other (See Comments)       nausea       Constitutional Retractions/cyanosis {No      Head and Face    Lesions or masses No  facies symmetrical Yes   Eyes Ocular motion with gaze alignment Yes   Ears Inspection: Scars, lesions or masses No   Otoscopy  EAC patent bilaterally without occlusion External ears normal. Canals clear.  Right TM with patent T-tube with perforation around the t-tube; left well healed postauricular incision with thickened and retracted TM with possible recurrent cholesteatoma      Nasal Inspection     No external Scars, lesions or masses     Pyriform apertures widely patent     Nasal musosa healthy   Septum Midline, no Septal Perforation, no septal hematoma   Turbinates Intact, healthy    Oral Cavity Lips no lesions     Teeth healthy without cavities     Gums no lesions     Oral mucosa healthy     Hard and Soft Palate no lesions     Uvula single fid      Tongue no lesions     Tonsils normal size Symmetric without exudate   Neck . Neck supple without tenderness or crepitus   Lymph  Cranial Nerve exam No palpable adenopathy  Grossly intact.  CN VII symmetrical   Respiration Symmetric without Increased work of breathing    Cardiovacular No Cyanosis    Skin healthy   Diagnostics     Test ordered No orders of the defined types were placed in this encounter.      Review of existing tests         Lab Results   Component Value Date/Time     WBC 13.7 (H) 04/24/2017 05:53 AM     HGB 15.5 04/24/2017 05:53 AM     HCT 45.0 04/24/2017 05:53 AM      04/24/2017 05:53 AM     MCV 97.2 04/24/2017 05:53 AM     MCH 33.5 04/24/2017 05:53 AM     MCHC 34.4 04/24/2017 05:53 AM     RDW 13.3 04/24/2017 05:53 AM     NEUTOPHILPCT 52.5 04/24/2017 05:53 AM     LYMPHOPCT 36.1 04/24/2017 05:53 AM     MONOPCT 7.0 04/24/2017 05:53 AM     EOSRELPCT 2.9 04/24/2017 05:53 AM     BASOPCT 0.3 04/24/2017 05:53 AM     NEUTROABS 7.18 04/24/2017 05:53 AM     LYMPHSABS 4.93 (H) 04/24/2017 05:53 AM     EOSABS 0.40 04/24/2017 05:53 AM          Old records  Reviewed   Discussion with other providers     Done      On this date 10/6/2021 I have spent 15 minutes reviewing previous notes, test results and 45 minutes face to face with the patient discussing the diagnosis and importance of compliance with the treatment plan as well as documenting on the day of the visit.     A/P     Impression / Plan:      Ricardo Parker is a 66 y.o.  male with left cholesteatoma and right perforation with retained ear tube and left profound SNHL and right mixed hearing loss confirmed by audiogram and examination, who will benefit from left tympanoplasty possible tympanoplasty (no OCR due to profound loss) and right tube removal. The rest of the exam was benign      Patient will benefit from hearing aids regardless of surgery   Risks and benefits discussed with family including persistent perforation, chronic ear disease, infection, pain, scarring, facial paralysis, hearing loss, recurrent disease   Patient will need an audiogram once healed  PCP and cardiology clearance   Old records were reviewed      Lyle Pickering MD 10/6/21 10:28 AM EDT

## 2021-11-01 ENCOUNTER — ANESTHESIA (OUTPATIENT)
Dept: OPERATING ROOM | Age: 71
End: 2021-11-01
Payer: MEDICARE

## 2021-11-01 ENCOUNTER — HOSPITAL ENCOUNTER (OUTPATIENT)
Age: 71
Setting detail: OBSERVATION
Discharge: HOME | End: 2021-11-02
Attending: OTOLARYNGOLOGY | Admitting: FAMILY MEDICINE
Payer: MEDICARE

## 2021-11-01 ENCOUNTER — ANESTHESIA EVENT (OUTPATIENT)
Dept: OPERATING ROOM | Age: 71
End: 2021-11-01
Payer: MEDICARE

## 2021-11-01 VITALS — TEMPERATURE: 95.9 F | OXYGEN SATURATION: 99 % | SYSTOLIC BLOOD PRESSURE: 101 MMHG | DIASTOLIC BLOOD PRESSURE: 56 MMHG

## 2021-11-01 DIAGNOSIS — Z01.818 PREOP TESTING: Primary | ICD-10-CM

## 2021-11-01 PROBLEM — I49.9 IRREGULAR CARDIAC RHYTHM: Status: ACTIVE | Noted: 2021-11-01

## 2021-11-01 LAB
ALBUMIN SERPL-MCNC: 4 G/DL (ref 3.5–5.2)
ALP BLD-CCNC: 84 U/L (ref 40–129)
ALT SERPL-CCNC: 29 U/L (ref 0–40)
ANION GAP SERPL CALCULATED.3IONS-SCNC: 10 MMOL/L (ref 7–16)
ANION GAP SERPL CALCULATED.3IONS-SCNC: 12 MMOL/L (ref 7–16)
APTT: 26.7 SEC (ref 24.5–35.1)
AST SERPL-CCNC: 23 U/L (ref 0–39)
BILIRUB SERPL-MCNC: 0.4 MG/DL (ref 0–1.2)
BUN BLDV-MCNC: 32 MG/DL (ref 6–23)
BUN BLDV-MCNC: 35 MG/DL (ref 6–23)
CALCIUM SERPL-MCNC: 9.1 MG/DL (ref 8.6–10.2)
CALCIUM SERPL-MCNC: 9.9 MG/DL (ref 8.6–10.2)
CHLORIDE BLD-SCNC: 100 MMOL/L (ref 98–107)
CHLORIDE BLD-SCNC: 104 MMOL/L (ref 98–107)
CO2: 26 MMOL/L (ref 22–29)
CO2: 29 MMOL/L (ref 22–29)
CREAT SERPL-MCNC: 1.4 MG/DL (ref 0.7–1.2)
CREAT SERPL-MCNC: 1.4 MG/DL (ref 0.7–1.2)
GFR AFRICAN AMERICAN: >60
GFR AFRICAN AMERICAN: >60
GFR NON-AFRICAN AMERICAN: 50 ML/MIN/1.73
GFR NON-AFRICAN AMERICAN: 50 ML/MIN/1.73
GLUCOSE BLD-MCNC: 104 MG/DL (ref 74–99)
GLUCOSE BLD-MCNC: 95 MG/DL (ref 74–99)
HCT VFR BLD CALC: 49.7 % (ref 37–54)
HCT VFR BLD CALC: 51.1 % (ref 37–54)
HEMOGLOBIN: 16.6 G/DL (ref 12.5–16.5)
HEMOGLOBIN: 17.4 G/DL (ref 12.5–16.5)
INR BLD: 1.2
MAGNESIUM: 2.1 MG/DL (ref 1.6–2.6)
MCH RBC QN AUTO: 32.9 PG (ref 26–35)
MCH RBC QN AUTO: 33.2 PG (ref 26–35)
MCHC RBC AUTO-ENTMCNC: 33.4 % (ref 32–34.5)
MCHC RBC AUTO-ENTMCNC: 34.1 % (ref 32–34.5)
MCV RBC AUTO: 97.5 FL (ref 80–99.9)
MCV RBC AUTO: 98.4 FL (ref 80–99.9)
PDW BLD-RTO: 13.4 FL (ref 11.5–15)
PDW BLD-RTO: 13.6 FL (ref 11.5–15)
PLATELET # BLD: 145 E9/L (ref 130–450)
PLATELET # BLD: 181 E9/L (ref 130–450)
PMV BLD AUTO: 9.5 FL (ref 7–12)
PMV BLD AUTO: 9.6 FL (ref 7–12)
POTASSIUM REFLEX MAGNESIUM: 3.5 MMOL/L (ref 3.5–5)
POTASSIUM SERPL-SCNC: 4.7 MMOL/L (ref 3.5–5)
PROTHROMBIN TIME: 13 SEC (ref 9.3–12.4)
RBC # BLD: 5.05 E12/L (ref 3.8–5.8)
RBC # BLD: 5.24 E12/L (ref 3.8–5.8)
SODIUM BLD-SCNC: 139 MMOL/L (ref 132–146)
SODIUM BLD-SCNC: 142 MMOL/L (ref 132–146)
TOTAL PROTEIN: 7.3 G/DL (ref 6.4–8.3)
WBC # BLD: 10.2 E9/L (ref 4.5–11.5)
WBC # BLD: 12.6 E9/L (ref 4.5–11.5)

## 2021-11-01 PROCEDURE — 80048 BASIC METABOLIC PNL TOTAL CA: CPT

## 2021-11-01 PROCEDURE — 94664 DEMO&/EVAL PT USE INHALER: CPT

## 2021-11-01 PROCEDURE — 6360000002 HC RX W HCPCS: Performed by: STUDENT IN AN ORGANIZED HEALTH CARE EDUCATION/TRAINING PROGRAM

## 2021-11-01 PROCEDURE — 7100000000 HC PACU RECOVERY - FIRST 15 MIN: Performed by: OTOLARYNGOLOGY

## 2021-11-01 PROCEDURE — 2580000003 HC RX 258: Performed by: FAMILY MEDICINE

## 2021-11-01 PROCEDURE — G0378 HOSPITAL OBSERVATION PER HR: HCPCS

## 2021-11-01 PROCEDURE — 6370000000 HC RX 637 (ALT 250 FOR IP): Performed by: ANESTHESIOLOGY

## 2021-11-01 PROCEDURE — 6360000002 HC RX W HCPCS: Performed by: FAMILY MEDICINE

## 2021-11-01 PROCEDURE — 3700000000 HC ANESTHESIA ATTENDED CARE: Performed by: OTOLARYNGOLOGY

## 2021-11-01 PROCEDURE — 6370000000 HC RX 637 (ALT 250 FOR IP): Performed by: INTERNAL MEDICINE

## 2021-11-01 PROCEDURE — 2500000003 HC RX 250 WO HCPCS: Performed by: OTOLARYNGOLOGY

## 2021-11-01 PROCEDURE — 3600000002 HC SURGERY LEVEL 2 BASE: Performed by: OTOLARYNGOLOGY

## 2021-11-01 PROCEDURE — 85610 PROTHROMBIN TIME: CPT

## 2021-11-01 PROCEDURE — 36415 COLL VENOUS BLD VENIPUNCTURE: CPT

## 2021-11-01 PROCEDURE — 2500000003 HC RX 250 WO HCPCS: Performed by: NURSE ANESTHETIST, CERTIFIED REGISTERED

## 2021-11-01 PROCEDURE — 93005 ELECTROCARDIOGRAM TRACING: CPT | Performed by: ANESTHESIOLOGY

## 2021-11-01 PROCEDURE — 3700000001 HC ADD 15 MINUTES (ANESTHESIA): Performed by: OTOLARYNGOLOGY

## 2021-11-01 PROCEDURE — 6370000000 HC RX 637 (ALT 250 FOR IP)

## 2021-11-01 PROCEDURE — 85730 THROMBOPLASTIN TIME PARTIAL: CPT

## 2021-11-01 PROCEDURE — 2709999900 HC NON-CHARGEABLE SUPPLY: Performed by: OTOLARYNGOLOGY

## 2021-11-01 PROCEDURE — 83735 ASSAY OF MAGNESIUM: CPT

## 2021-11-01 PROCEDURE — 99220 PR INITIAL OBSERVATION CARE/DAY 70 MINUTES: CPT | Performed by: FAMILY MEDICINE

## 2021-11-01 PROCEDURE — 6360000002 HC RX W HCPCS: Performed by: NURSE ANESTHETIST, CERTIFIED REGISTERED

## 2021-11-01 PROCEDURE — 3600000012 HC SURGERY LEVEL 2 ADDTL 15MIN: Performed by: OTOLARYNGOLOGY

## 2021-11-01 PROCEDURE — 99205 OFFICE O/P NEW HI 60 MIN: CPT | Performed by: INTERNAL MEDICINE

## 2021-11-01 PROCEDURE — 85027 COMPLETE CBC AUTOMATED: CPT

## 2021-11-01 PROCEDURE — 7100000001 HC PACU RECOVERY - ADDTL 15 MIN: Performed by: OTOLARYNGOLOGY

## 2021-11-01 PROCEDURE — 80053 COMPREHEN METABOLIC PANEL: CPT

## 2021-11-01 RX ORDER — FENTANYL CITRATE 50 UG/ML
INJECTION, SOLUTION INTRAMUSCULAR; INTRAVENOUS PRN
Status: DISCONTINUED | OUTPATIENT
Start: 2021-11-01 | End: 2021-11-01 | Stop reason: SDUPTHER

## 2021-11-01 RX ORDER — SODIUM CHLORIDE 0.9 % (FLUSH) 0.9 %
5-40 SYRINGE (ML) INJECTION EVERY 12 HOURS SCHEDULED
Status: DISCONTINUED | OUTPATIENT
Start: 2021-11-01 | End: 2021-11-02 | Stop reason: HOSPADM

## 2021-11-01 RX ORDER — SUCCINYLCHOLINE/SOD CL,ISO/PF 200MG/10ML
SYRINGE (ML) INTRAVENOUS PRN
Status: DISCONTINUED | OUTPATIENT
Start: 2021-11-01 | End: 2021-11-01 | Stop reason: SDUPTHER

## 2021-11-01 RX ORDER — NICOTINE 21 MG/24HR
1 PATCH, TRANSDERMAL 24 HOURS TRANSDERMAL DAILY
Status: DISCONTINUED | OUTPATIENT
Start: 2021-11-01 | End: 2021-11-02 | Stop reason: HOSPADM

## 2021-11-01 RX ORDER — SODIUM CHLORIDE 9 MG/ML
25 INJECTION, SOLUTION INTRAVENOUS PRN
Status: DISCONTINUED | OUTPATIENT
Start: 2021-11-01 | End: 2021-11-02 | Stop reason: HOSPADM

## 2021-11-01 RX ORDER — DEXAMETHASONE SODIUM PHOSPHATE 4 MG/ML
INJECTION, SOLUTION INTRA-ARTICULAR; INTRALESIONAL; INTRAMUSCULAR; INTRAVENOUS; SOFT TISSUE PRN
Status: DISCONTINUED | OUTPATIENT
Start: 2021-11-01 | End: 2021-11-01 | Stop reason: SDUPTHER

## 2021-11-01 RX ORDER — SODIUM CHLORIDE 9 MG/ML
25 INJECTION, SOLUTION INTRAVENOUS PRN
Status: DISCONTINUED | OUTPATIENT
Start: 2021-11-01 | End: 2021-11-01 | Stop reason: HOSPADM

## 2021-11-01 RX ORDER — SODIUM CHLORIDE 0.9 % (FLUSH) 0.9 %
5-40 SYRINGE (ML) INJECTION PRN
Status: DISCONTINUED | OUTPATIENT
Start: 2021-11-01 | End: 2021-11-02 | Stop reason: HOSPADM

## 2021-11-01 RX ORDER — LIDOCAINE HYDROCHLORIDE AND EPINEPHRINE 10; 10 MG/ML; UG/ML
INJECTION, SOLUTION INFILTRATION; PERINEURAL PRN
Status: DISCONTINUED | OUTPATIENT
Start: 2021-11-01 | End: 2021-11-01 | Stop reason: ALTCHOICE

## 2021-11-01 RX ORDER — SODIUM CHLORIDE 0.9 % (FLUSH) 0.9 %
5-40 SYRINGE (ML) INJECTION PRN
Status: DISCONTINUED | OUTPATIENT
Start: 2021-11-01 | End: 2021-11-01 | Stop reason: HOSPADM

## 2021-11-01 RX ORDER — POLYETHYLENE GLYCOL 3350 17 G/17G
17 POWDER, FOR SOLUTION ORAL DAILY PRN
Status: DISCONTINUED | OUTPATIENT
Start: 2021-11-01 | End: 2021-11-02 | Stop reason: HOSPADM

## 2021-11-01 RX ORDER — ONDANSETRON 2 MG/ML
INJECTION INTRAMUSCULAR; INTRAVENOUS PRN
Status: DISCONTINUED | OUTPATIENT
Start: 2021-11-01 | End: 2021-11-01 | Stop reason: SDUPTHER

## 2021-11-01 RX ORDER — IPRATROPIUM BROMIDE AND ALBUTEROL SULFATE 2.5; .5 MG/3ML; MG/3ML
1 SOLUTION RESPIRATORY (INHALATION) ONCE
Status: COMPLETED | OUTPATIENT
Start: 2021-11-01 | End: 2021-11-01

## 2021-11-01 RX ORDER — ONDANSETRON 2 MG/ML
4 INJECTION INTRAMUSCULAR; INTRAVENOUS EVERY 6 HOURS PRN
Status: DISCONTINUED | OUTPATIENT
Start: 2021-11-01 | End: 2021-11-02 | Stop reason: HOSPADM

## 2021-11-01 RX ORDER — MEPERIDINE HYDROCHLORIDE 25 MG/ML
12.5 INJECTION INTRAMUSCULAR; INTRAVENOUS; SUBCUTANEOUS EVERY 5 MIN PRN
Status: DISCONTINUED | OUTPATIENT
Start: 2021-11-01 | End: 2021-11-01 | Stop reason: HOSPADM

## 2021-11-01 RX ORDER — POTASSIUM CHLORIDE 20 MEQ/1
40 TABLET, EXTENDED RELEASE ORAL ONCE
Status: COMPLETED | OUTPATIENT
Start: 2021-11-01 | End: 2021-11-01

## 2021-11-01 RX ORDER — ONDANSETRON 4 MG/1
4 TABLET, ORALLY DISINTEGRATING ORAL EVERY 8 HOURS PRN
Status: DISCONTINUED | OUTPATIENT
Start: 2021-11-01 | End: 2021-11-02 | Stop reason: HOSPADM

## 2021-11-01 RX ORDER — SODIUM CHLORIDE 0.9 % (FLUSH) 0.9 %
5-40 SYRINGE (ML) INJECTION EVERY 12 HOURS SCHEDULED
Status: DISCONTINUED | OUTPATIENT
Start: 2021-11-01 | End: 2021-11-01 | Stop reason: HOSPADM

## 2021-11-01 RX ORDER — PROPOFOL 10 MG/ML
INJECTION, EMULSION INTRAVENOUS PRN
Status: DISCONTINUED | OUTPATIENT
Start: 2021-11-01 | End: 2021-11-01 | Stop reason: SDUPTHER

## 2021-11-01 RX ORDER — ONDANSETRON 2 MG/ML
4 INJECTION INTRAMUSCULAR; INTRAVENOUS
Status: DISCONTINUED | OUTPATIENT
Start: 2021-11-01 | End: 2021-11-01 | Stop reason: HOSPADM

## 2021-11-01 RX ORDER — ACETAMINOPHEN 650 MG/1
650 SUPPOSITORY RECTAL EVERY 6 HOURS PRN
Status: DISCONTINUED | OUTPATIENT
Start: 2021-11-01 | End: 2021-11-02 | Stop reason: HOSPADM

## 2021-11-01 RX ORDER — PHENYLEPHRINE HYDROCHLORIDE 10 MG/ML
INJECTION INTRAVENOUS PRN
Status: DISCONTINUED | OUTPATIENT
Start: 2021-11-01 | End: 2021-11-01 | Stop reason: SDUPTHER

## 2021-11-01 RX ORDER — ACETAMINOPHEN 325 MG/1
650 TABLET ORAL EVERY 6 HOURS PRN
Status: DISCONTINUED | OUTPATIENT
Start: 2021-11-01 | End: 2021-11-02 | Stop reason: HOSPADM

## 2021-11-01 RX ORDER — LIDOCAINE HYDROCHLORIDE 20 MG/ML
INJECTION, SOLUTION EPIDURAL; INFILTRATION; INTRACAUDAL; PERINEURAL PRN
Status: DISCONTINUED | OUTPATIENT
Start: 2021-11-01 | End: 2021-11-01 | Stop reason: SDUPTHER

## 2021-11-01 RX ADMIN — ONDANSETRON 4 MG: 2 INJECTION INTRAMUSCULAR; INTRAVENOUS at 14:55

## 2021-11-01 RX ADMIN — FENTANYL CITRATE 25 MCG: 50 INJECTION, SOLUTION INTRAMUSCULAR; INTRAVENOUS at 15:20

## 2021-11-01 RX ADMIN — LIDOCAINE HYDROCHLORIDE 40 MG: 20 INJECTION, SOLUTION EPIDURAL; INFILTRATION; INTRACAUDAL; PERINEURAL at 15:12

## 2021-11-01 RX ADMIN — PROPOFOL 170 MG: 10 INJECTION, EMULSION INTRAVENOUS at 15:12

## 2021-11-01 RX ADMIN — FENTANYL CITRATE 50 MCG: 50 INJECTION, SOLUTION INTRAMUSCULAR; INTRAVENOUS at 15:00

## 2021-11-01 RX ADMIN — PHENYLEPHRINE HYDROCHLORIDE 50 MCG: 10 INJECTION INTRAVENOUS at 15:25

## 2021-11-01 RX ADMIN — LIDOCAINE HYDROCHLORIDE 60 MG: 20 INJECTION, SOLUTION EPIDURAL; INFILTRATION; INTRACAUDAL; PERINEURAL at 15:37

## 2021-11-01 RX ADMIN — DEXAMETHASONE SODIUM PHOSPHATE 10 MG: 4 INJECTION, SOLUTION INTRAMUSCULAR; INTRAVENOUS at 15:24

## 2021-11-01 RX ADMIN — IPRATROPIUM BROMIDE AND ALBUTEROL SULFATE 1 AMPULE: .5; 2.5 SOLUTION RESPIRATORY (INHALATION) at 13:02

## 2021-11-01 RX ADMIN — PHENYLEPHRINE HYDROCHLORIDE 50 MCG: 10 INJECTION INTRAVENOUS at 15:32

## 2021-11-01 RX ADMIN — ENOXAPARIN SODIUM 40 MG: 40 INJECTION SUBCUTANEOUS at 20:59

## 2021-11-01 RX ADMIN — POTASSIUM CHLORIDE 40 MEQ: 1500 TABLET, EXTENDED RELEASE ORAL at 20:59

## 2021-11-01 RX ADMIN — Medication 75 MG: at 15:12

## 2021-11-01 RX ADMIN — Medication 2000 MG: at 15:05

## 2021-11-01 RX ADMIN — SODIUM CHLORIDE, PRESERVATIVE FREE 10 ML: 5 INJECTION INTRAVENOUS at 20:58

## 2021-11-01 ASSESSMENT — PULMONARY FUNCTION TESTS
PIF_VALUE: 20
PIF_VALUE: 1
PIF_VALUE: 15
PIF_VALUE: 0
PIF_VALUE: 26
PIF_VALUE: 25
PIF_VALUE: 2
PIF_VALUE: 5
PIF_VALUE: 19
PIF_VALUE: 20
PIF_VALUE: 16
PIF_VALUE: 1
PIF_VALUE: 1
PIF_VALUE: 25
PIF_VALUE: 31
PIF_VALUE: 32
PIF_VALUE: 20
PIF_VALUE: 16
PIF_VALUE: 10
PIF_VALUE: 0
PIF_VALUE: 24
PIF_VALUE: 1
PIF_VALUE: 1
PIF_VALUE: 10
PIF_VALUE: 24
PIF_VALUE: 20
PIF_VALUE: 25
PIF_VALUE: 26
PIF_VALUE: 10
PIF_VALUE: 24
PIF_VALUE: 24
PIF_VALUE: 25
PIF_VALUE: 26
PIF_VALUE: 10
PIF_VALUE: 32
PIF_VALUE: 27
PIF_VALUE: 1
PIF_VALUE: 25
PIF_VALUE: 0
PIF_VALUE: 20
PIF_VALUE: 1
PIF_VALUE: 16
PIF_VALUE: 0
PIF_VALUE: 15
PIF_VALUE: 1
PIF_VALUE: 1
PIF_VALUE: 19
PIF_VALUE: 20
PIF_VALUE: 20
PIF_VALUE: 24
PIF_VALUE: 20
PIF_VALUE: 20
PIF_VALUE: 26
PIF_VALUE: 20
PIF_VALUE: 1

## 2021-11-01 ASSESSMENT — PAIN SCALES - GENERAL: PAINLEVEL_OUTOF10: 0

## 2021-11-01 ASSESSMENT — PAIN - FUNCTIONAL ASSESSMENT: PAIN_FUNCTIONAL_ASSESSMENT: 0-10

## 2021-11-01 ASSESSMENT — LIFESTYLE VARIABLES: SMOKING_STATUS: 1

## 2021-11-01 ASSESSMENT — COPD QUESTIONNAIRES: CAT_SEVERITY: MODERATE

## 2021-11-01 NOTE — PROGRESS NOTES
1610: Dr. Sandhya Briscoe verbal order for cardiology consult, to see if patient is safe to discharge home. Dr. Satinder James aware of this plan. If needs admitted, will need admission to medicine. Patient follows with Cardiologist in [facility] [employer], ok to consult [facility]. 1635: Returned phone call from Dr. Enrique Vyas, patient will need admitted for observation. Spoke with Dr. Gavin Naqvi regarding admission. No further orders given. Agree with labs for now. Await bed placement. Patient updated on plan. Patient's wife updated as well via waiting room. 1710: Dr. Tru Jones and Deni Garcia NP at bedside. Labs drawn and sent. 1730: Dr. Satinder James aware of patient's admission. 1820: [employer] completed. Report will go to ER (since  for ER), spoke with [facility] in charge in ER, to send report to 6S. Holli TIMMONS updated and aware.

## 2021-11-01 NOTE — H&P
[facility] History and Physical      CHIEF COMPLAINT:  [facility] during outpatient ENT procedure     History of Present Illness:     Patient is 67 yo male patient who follows with PCP Dr. Kendall Castro with a past medical history of atrial fibrillation, CHF, HTN, HLD, and tobacco use. Surgical history of multiple cardiac ablations,  pacemaker, abdominal aortic aneurysm repair, cardiac cath and inner ear surgery. Patient presented to [facility] for elective outpt ENT procedure- Left cholesteatoma myringotomy tube right ear. He has been having significant hearing loss and following with ENT. During induction of anesthesia prior to procedure pt had a several minute episode of VTACH. Also noted that he had a brief period of low BP with systolic BP in the 12I. He received local injection with epi for bleeding from ear, neosynephrine bolus and lidocaine and VTACH was terminated. Procedure was then canceled. Pt follows at [facility] for cardiology. Patient resting in PACU eating ice chips. Reporting he feels \" fine. \" Denies chest pain or sob. Reporting he has been in usual state of health prior to procedure. Denies fevers, chills, nausea, vomiting, dysuria, hematuria, hematochezia. Reporting he last followed with Cardiology several months ago. Had cardiac cath several years ago.  Not sure when last stress test.     Informant(s) for H&P:patient, chart review    REVIEW OF SYSTEMS:  A comprehensive review of systems was negative except for: what is in the HPI      PMH:  Past Medical History:   Diagnosis Date    Atrial fibrillation ([address]     CHF (congestive heart failure) (HCC) Echo 2/15/14     EF 91% prob diastolic    History of cardiovascular stress test 2/17/14    lexiscan    Hyperlipidemia     Hypertension     Obstructive chronic bronchitis with exacerbation ([address] 2/18/2014    Pneumonia     Tobacco dependence 4/10/2014       Surgical History:  Past Surgical History:   Procedure Laterality Date    ABDOMINAL AORTIC ANEURYSM REPAIR  2014    Dr. Siomara Alexis  4/17/2014    By Dr. Christiana Murrieta  2021    Deric Given  1970's-80's       Medications Prior to Admission:    Prior to Admission medications    Medication Sig Start Date End Date Taking? Authorizing Provider   azelastine (ASTELIN) 0.1 % nasal spray 2 sprays by Nasal route 2 times daily Use in each nostril as directed 9/10/21  Yes Cody Jane,    loratadine (CLARITIN) 10 MG tablet Take 10 mg by mouth daily as needed    Yes Historical Provider, MD   chlorthalidone (HYGROTON) 25 MG tablet Take 25 mg by mouth daily   Yes Historical Provider, MD   Multiple Vitamins-Minerals (THERAPEUTIC MULTIVITAMIN-MINERALS) tablet Take 1 tablet by mouth daily   Yes Historical Provider, MD   warfarin (COUMADIN) 3 MG tablet Take 3 mg by mouth daily Takes 3 mg for two days, then 1.5 mg the next day. Then he takes 3 mg for two more days then takes 1.5 mg again. Took 3 mg on 4/22/17 so he is due to take 3 mg tonight 4/23/17 then 1.5 mg on 4/24/17. Yes Historical Provider, MD       Allergies:    Carvedilol and Augmentin [amoxicillin-pot clavulanate]    Social History:    reports that he has quit smoking. His smoking use included cigarettes. He has a 4.00 pack-year smoking history. He has never used smokeless tobacco. He reports current alcohol use of about 2.0 standard drinks of alcohol per week. He reports that he does not use drugs. Denies etoh use  Smokes 1/2 \ppd  Denies illicits      Family History:   family history is not on file.    H/o stroke on maternal side    PHYSICAL EXAM:  Vitals:  /67   Pulse 71   Temp 96.8 °F (36 °C) (Tympanic)   Resp 14   Ht 5' 10\" (1.778 m)   Wt 205 lb (93 kg)   SpO2 98%   BMI 29.41 kg/m²   General Appearance: alert and oriented to person, place and time and in no acute distress  Skin: warm and dry  Head: normocephalic and atraumatic  Neck: neck supple and non tender without mass   Pulmonary/Chest: clear to auscultation bilaterally, left chest pacer   Cardiovascular: normal rate, normal S1 and S2 and no carotid bruits  Abdomen: soft, non-tender, non-distended, normal bowel soundsy  Extremities: no cyanosis, no clubbing and no edema  Neurologic: speech normal         LABS:  Recent Labs     11/01/21  1145      K 4.7      CO2 29   BUN 35*   CREATININE 1.4*   GLUCOSE 95   CALCIUM 9.9       Recent Labs     11/01/21  1145   WBC 12.6*   RBC 5.24   HGB 17.4*   HCT 51.1   MCV 97.5   MCH 33.2   MCHC 34.1   RDW 13.4      MPV 9.6       No results for input(s): POCGLU in the last 72 hours. Radiology:   No orders to display           ASSESSMENT:      Active Problems:    Irregular cardiac rhythm  Resolved Problems:    * No resolved hospital problems. *      PLAN:    1. VTACH: pt scheduled for outpt ENT procedure today. After induction of general anesthesia patient had several minute run of [id number] [email or web address] with sbp in the 62s. He received epi, neosynephrine and lidocaine bolus and terminated VTACH. Procedure was then canceled. He follows at [facility] for cardiology. He he as history of atrial fib/ flutter, h/o multiple ablations, SSS with PPPm, MVP s/p repair with biatrial maze and left atrial appendage excision, mild CAD and AAA. Pt seen sitting in PACU eating ice chips. No complaints. Reporting he feels good and has been in usual health prior to procedure. Cardiology contacted and recommending observation. 2. Atrial fibrillation/ flutter: pt on chronic anticoagulation- coumadin. Has been on hold since wed for procedure. 3. SSS s/p pacer    4. MVP s/p repair with biatrial maze and left atrial appendage excision    5. Recent issues with left hearing loss: following with ENT --scheduled for Left cholesteatoma myringotomy tube right ear    6. Suspect underlying CKD vs OUSMANE; creatinine 1.4. monitor    7. Leukocytosis: 12.6.  ? Reactive. Monitor     8. Tobacco use: smokes 1/2 ppd. Declines nicotine patch. Code Status: FULL   DVT prophylaxis: coumadin      NOTE: This report was transcribed using voice recognition software. Every effort was made to ensure accuracy; however, inadvertent computerized transcription errors may be present.   Electronically signed by MAGGIE Joy on 11/1/2021 at 4:44 PM

## 2021-11-01 NOTE — ANESTHESIA PRE PROCEDURE
Department of Anesthesiology  Preprocedure Note       Name:  Kristin Hanley   Age:  66 y.o.  :  1950                                          MRN:  [id number]         Date:  2021      Surgeon: Antonia Crawford):  Celestina Beach MD    Procedure: Procedure(s):  LEFT TYMPANOPLASTY POSSIBLE TYMPANOPLASTY  MASTOIDECTOMY  RIGHT TUBE REMOVAL    Medications prior to admission:   Prior to Admission medications    Medication Sig Start Date End Date Taking? Authorizing Provider   azelastine (ASTELIN) 0.1 % nasal spray 2 sprays by Nasal route 2 times daily Use in each nostril as directed 9/10/21  Yes Cody Jane,    loratadine (CLARITIN) 10 MG tablet Take 10 mg by mouth daily as needed    Yes Historical Provider, MD   chlorthalidone (HYGROTON) 25 MG tablet Take 25 mg by mouth daily   Yes Historical Provider, MD   Multiple Vitamins-Minerals (THERAPEUTIC MULTIVITAMIN-MINERALS) tablet Take 1 tablet by mouth daily   Yes Historical Provider, MD   warfarin (COUMADIN) 3 MG tablet Take 3 mg by mouth daily Takes 3 mg for two days, then 1.5 mg the next day. Then he takes 3 mg for two more days then takes 1.5 mg again. Took 3 mg on 17 so he is due to take 3 mg tonight 17 then 1.5 mg on 17. Yes Historical Provider, MD       Current medications:    Current Facility-Administered Medications   Medication Dose Route Frequency Provider Last Rate Last Admin    sodium chloride flush 0.9 % injection 5-40 mL  5-40 mL IntraVENous 2 times per day Oskar Rattler, DO        sodium chloride flush 0.9 % injection 5-40 mL  5-40 mL IntraVENous PRN Oskar Rattler, DO        0.9 % sodium chloride infusion  25 mL IntraVENous PRN Oskar Rattler, DO        ceFAZolin (ANCEF) 2000 mg in sterile water 20 mL IV syringe  2,000 mg IntraVENous On Call to [id number] Susan Love, DO           Allergies:     Allergies   Allergen Reactions    Carvedilol Other (See Comments) and Shortness Of Breath    Augmentin [Amoxicillin-Pot Clavulanate] Nausea Only and Other (See Comments)     nausea       Problem List:    Patient Active Problem List   Diagnosis Code    Pulmonary congestion and hypostasis J81.1    Atrial flutter (HCC) I48.92    Essential hypertension, benign I10    Obstructive chronic bronchitis with exacerbation (HCC) J44.1    Cardiomyopathy ([address] I42.9    Mitral regurgitation I34.0    Hypertensive cardiomyopathy ([address] I11.9, I43    AAA (abdominal aortic aneurysm) ([address] I71.4    Tobacco dependence F17.200    Cardiomyopathy, nonischemic I42.9    Cholesteatoma of attic of ear, left H71.02    Sensorineural hearing loss (SNHL), bilateral H90.3    Retained myringotomy tube in right ear Z96.22       Past Medical History:        Diagnosis Date    Atrial fibrillation ([address]     CHF (congestive heart failure) ([address] Echo 2/15/14     EF 72% prob diastolic    History of cardiovascular stress test 2/17/14    lexiscan    Hyperlipidemia     Hypertension     Obstructive chronic bronchitis with exacerbation ([address] 2/18/2014    Pneumonia     Tobacco dependence 4/10/2014       Past Surgical History:        Procedure Laterality Date    ABDOMINAL AORTIC ANEURYSM REPAIR  2014    Dr. Harvinder Woo  4/17/2014    By Dr. Maggy Roberson  2021    [facility] CYST EXCISION  1970's-80's       Social History:    Social History     Tobacco Use    Smoking status: Former Smoker     Packs/day: 0.10     Years: 40.00     Pack years: 4.00     Types: Cigarettes    Smokeless tobacco: Never Used    Tobacco comment: occassionally a cigarette   Substance Use Topics    Alcohol use:  Yes     Alcohol/week: 2.0 standard drinks     Types: 2 Cans of beer per week     Comment: maybe 2-3 beers a year                                Counseling given: Not Answered  Comment: occassionally a cigarette      Vital Signs (Current):   Vitals:    10/29/21 0910 11/01/21 1150   BP:  (!) 150/73   Pulse:  62   Resp:  18   Temp:  97.1 °F (36.2 °C)   TempSrc:  Temporal   SpO2:  94%   Weight: 205 lb (93 kg) 205 lb (93 kg)   Height: 5' 10\" (1.778 m) 5' 10\" (1.778 m)                                              BP Readings from Last 3 Encounters:   11/01/21 (!) 150/73   10/06/21 (!) 146/84   09/24/21 135/78       NPO Status: Time of last liquid consumption: 2230                        Time of last solid consumption: 2230                        Date of last liquid consumption: 10/31/21                        Date of last solid food consumption: 10/31/21    BMI:   Wt Readings from Last 3 Encounters:   11/01/21 205 lb (93 kg)   10/06/21 207 lb (93.9 kg)   09/24/21 207 lb (93.9 kg)     Body mass index is 29.41 kg/m². CBC:   Lab Results   Component Value Date    WBC 12.6 11/01/2021    RBC 5.24 11/01/2021    HGB 17.4 11/01/2021    HCT 51.1 11/01/2021    MCV 97.5 11/01/2021    RDW 13.4 11/01/2021     11/01/2021       CMP:   Lab Results   Component Value Date     04/24/2017    K 4.0 04/24/2017     04/24/2017    CO2 23 04/24/2017    BUN 36 04/24/2017    CREATININE 1.6 04/24/2017    GFRAA 52 04/24/2017    LABGLOM 43 04/24/2017    GLUCOSE 97 04/24/2017    PROT 6.5 04/24/2017    CALCIUM 9.1 04/24/2017    BILITOT 0.5 04/24/2017    ALKPHOS 73 04/24/2017    AST 23 04/24/2017    ALT 36 04/24/2017       POC Tests: No results for input(s): POCGLU, POCNA, POCK, POCCL, POCBUN, POCHEMO, POCHCT in the last 72 hours.     Coags:   Lab Results   Component Value Date    PROTIME 13.0 11/01/2021    INR 1.2 11/01/2021    APTT 26.7 11/01/2021       HCG (If Applicable): No results found for: PREGTESTUR, PREGSERUM, HCG, HCGQUANT     ABGs: No results found for: PHART, PO2ART, YRE5NZJ, LUX4QGH, BEART, [id number]     Type & Screen (If Applicable):  No results found for: LABABO, LABRH    Drug/Infectious Status (If Applicable):  No results found for: HIV, HEPCAB    COVID-19 Screening (If Applicable):   Lab Results   Component Value Date    COVID19 Not Detected 10/27/2021           Anesthesia Evaluation  Patient summary reviewed  Airway: Mallampati: III  TM distance: >3 FB   Neck ROM: full  Mouth opening: > = 3 FB Dental:          Pulmonary: breath sounds clear to auscultation  (+) pneumonia: resolved,  COPD: moderate and severe,  current smoker          Patient did not smoke on day of surgery. Cardiovascular:    (+) hypertension: moderate, valvular problems/murmurs (MITRAL VALVE REPAIRED): MR, dysrhythmias: atrial flutter, CHF: systolic, hyperlipidemia      ECG reviewed  Rhythm: irregular  Rate: normal  Echocardiogram reviewed  Stress test reviewed                Neuro/Psych:               GI/Hepatic/Renal:   (+) morbid obesity     (-) liver disease and no renal disease       Endo/Other:        (-) diabetes mellitus, hypothyroidism               Abdominal:   (+) obese,           Vascular:   + PVD, aortic or cerebral, . Other Findings:             Anesthesia Plan      general     ASA 3       Induction: intravenous. MIPS: Postoperative opioids intended and Prophylactic antiemetics administered. Anesthetic plan and risks discussed with patient and spouse. Plan discussed with CRNA.                   Ede Lara MD   11/1/2021

## 2021-11-01 NOTE — PROGRESS NOTES
Patient had episode of Ventricular Tachycardia after induction. Case cancelled after induction per surgeon and anesthesia.

## 2021-11-01 NOTE — SIGNIFICANT EVENT
Patient was undergoing a Left Myringotomy for a Cholesteatoma. He was given locally epinephrine for bleeding. Some of this drug was absorbed systemically. Patient had several beats of Ventricular Tachycardia. The procedure was aborted. Patient is a smoker and has history of Mitral Valvular disease that was repaired in 2014. Later that same year he had an Aortic Aneurysm repaired. He has a pacemaker and sees a Cardiologist at the [facility]. He has CKD, stage 3a. He was cleared for surgery by his PCP. At this time, patient is without symptoms. He has a repeat CMP, Magnesium and CBC drawn and sent. His EKG shows sinus rhythm and a normal QTc. His telemetry monitoring has been with SR and PVCs and intermittent flutter. He will be going to room 637. See orders.

## 2021-11-01 NOTE — H&P
Gilma Lopez was seen and re-examined preoperatively today, November 1, 2021. There was no substantial change in his physical and medical status. Patient is fit for the proposed surgical procedure. All questions were appropriately addressed and had no further questions regarding the risks, benefits, and alternatives of the procedure. Gilma Lopez and family wished to proceed.     Moreen Krabbe, DO  Resident Physician  [facility])  Otolaryngology Residency  11/1/2021  2:13 PM

## 2021-11-01 NOTE — CONSULTS
INPATIENT CARDIOLOGY CONSULT    Name: Ricardo Parker    Age: 66 y.o. Date of Admission: 11/1/2021 10:46 AM    Date of Service: 11/1/2021    Reason for Consultation: Intraoperative \"cardiac arrhythmias,\" paroxysmal atrial fibrillation with associated sinus node dysfunction, chronic diastolic heart failure, chronic obstructive lung disease, mitral valve disease, hypertension, otitis media    Referring Physician: Israel Lyn    History of Present Illness: Patient is a 41-year-old white male with no association to [facility] and his entire previous cardiovascular care provided to the [facility]. He has a known history of paroxysmal atrial fibrillation/flutter as well as that of sinus node dysfunction with previous placement of a dual-chamber pacemaker [place]) with failure of previous antiarrhythmic therapy and pulmonary vein isolation in March, 2019 as well as that of November, 2020 with additional ablation of atrial flutter. He additionally has a history of mitral valve disease with a mitral valve repair in April, 2014 (30 mm [place] ring) associated with that of a Maze procedure and left atrial appendage occlusion with angiographically insignificant coronary atherosclerosis, chronic diastolic heart failure, chronic obstructive lung disease with ongoing tobacco consumption, hypertension, a remote abdominal aortic aneurysm and stage IIIa chronic kidney disease and otitis media. He remained clinically compensated at the time of most recent outpatient evaluation in February, 2021 with transtelephonic pacemaker monitoring demonstrating recurrent paroxysmal atrial fibrillation and most recent device interrogation of September, 2021 demonstrating a 5% atrial fibrillation burden with minimal ventricular pacing.   On previous interrogations wide-complex tachycardia was reported as that of nonsustained ventricular tachycardia occurring secondary to atrial flutter with rapid ventricular rates. At time of evaluation, he denies any recent cardiovascular symptoms of angina like chest discomfort or other ischemic equivalents, decompensated left-ventricular systolic dysfunction or volume overload no arrhythmia related manifestations. On the day of his hospitalization, he underwent a myringotomy for a cholesteatoma with a reported component of postoperative bleeding resolved with local epinephrine and during the procedure, he apparently was reported to have evidence of a wide-complex tachycardia with no available documentation. The resting electrocardiogram obtained postoperatively in the postoperative care unit demonstrated evidence of sinus rhythm with nonspecific ST changes with arrhythmia monitoring demonstrating transient episodes of paroxysmal atrial fibrillation and spontaneous conversion to sinus rhythm with laboratory studies demonstrating no evidence of metabolic derangements beyond that of stage IIIa chronic kidney disease. .    Review of Systems: The remainder of a complete multisystem review including consitutional, central nervous, respiratory, circulatory, gastrointestinal, genitourinary, endocrinologic, hematologic, musculoskeletal and psychiatric are negative.     Past Medical History:  Past Medical History:   Diagnosis Date    Atrial fibrillation ([address]     CHF (congestive heart failure) ([address] Echo 2/15/14     EF 00% prob diastolic    History of cardiovascular stress test 2/17/14    lexiscan    Hyperlipidemia     Hypertension     Obstructive chronic bronchitis with exacerbation ([address] 2/18/2014    Pneumonia     Tobacco dependence 4/10/2014       Past Surgical History:  Past Surgical History:   Procedure Laterality Date    ABDOMINAL AORTIC ANEURYSM REPAIR  2014    Dr. Mcdowell Ped  4/17/2014    By Dr. Jagjit Castro  2021    Gevena Needle MD Kim        sodium chloride flush 0.9 % injection 5-40 mL  5-40 mL IntraVENous PRN Shell Simpson MD        0.9 % sodium chloride infusion  25 mL IntraVENous PRN Shell Simpson MD        enoxaparin (LOVENOX) injection 40 mg  40 mg SubCUTAneous Daily Shell Simpson MD        ondansetron (ZOFRAN-ODT) disintegrating tablet 4 mg  4 mg Oral Q8H PRN Shell Simpson MD        Or    ondansetron [facility]) injection 4 mg  4 mg IntraVENous Q6H PRN Shell Simpson MD        polyethylene glycol (GLYCOLAX) packet 17 g  17 g Oral Daily PRN Shell Simpson MD        acetaminophen (TYLENOL) tablet 650 mg  650 mg Oral Q6H PRN Shell Simpson MD        Or   Suzy Mcguire acetaminophen (TYLENOL) suppository 650 mg  650 mg Rectal Q6H PRN Shell Simpson MD        perflutren lipid microspheres (DEFINITY) injection 1.65 mg  1.5 mL IntraVENous ONCE PRN Shell Simpson MD        nicotine (NICODERM CQ) 21 MG/24HR 1 patch  1 patch TransDERmal Daily Shell Simpson MD          sodium chloride      sodium chloride           Physical Exam:  /67   Pulse 71   Temp 96.8 °F (36 °C) (Tympanic)   Resp 14   Ht 5' 10\" (1.778 m)   Wt 205 lb (93 kg)   SpO2 98%   BMI 29.41 kg/m²   Weight change: Wt Readings from Last 3 Encounters:   11/01/21 205 lb (93 kg)   10/06/21 207 lb (93.9 kg)   09/24/21 207 lb (93.9 kg)     The patient is awake, alert and in no discomfort or distress. No gross musculoskeletal deformity or lymphadenopathy are present. No significant skin or nail changes are present. Gross examination of head, eyes, nose and throat are negative. Jugular venous pressure is normal and no carotid bruits are present. No thyromegaly is noted. Normal respiratory effort is noted with no accessory muscle usage present. Lung fields are clear to ascultation. Cardiac examination is notable for a regular rate and rhythm with no palpable thrill. No gallop rhythm or cardiac murmur are identified.  A benign abdominal examination is present with no masses or organomegaly. A normal abdominal aortic pulsation is present. Intact pulses are present throughout all extremities and no peripheral edema is present. No focal neurologic deficits are present. Intake/Output:    Intake/Output Summary (Last 24 hours) at 11/1/2021 1723  Last data filed at 11/1/2021 1551  Gross per 24 hour   Intake --   Output 0 ml   Net 0 ml     No intake/output data recorded. Laboratory Tests:  Lab Results   Component Value Date    CREATININE 1.4 (H) 11/01/2021    BUN 35 (H) 11/01/2021     11/01/2021    K 4.7 11/01/2021     11/01/2021    CO2 29 11/01/2021     No results for input(s): CKTOTAL, CKMB in the last 72 hours. Invalid input(s): TROPONONI  Lab Results   Component Value Date    BNP 4,987 (H) 02/14/2014     Lab Results   Component Value Date    WBC 10.2 11/01/2021    RBC 5.05 11/01/2021    HGB 16.6 11/01/2021    HCT 49.7 11/01/2021    MCV 98.4 11/01/2021    MCH 32.9 11/01/2021    MCHC 33.4 11/01/2021    RDW 13.6 11/01/2021     11/01/2021    MPV 9.5 11/01/2021     No results for input(s): ALKPHOS, ALT, AST, PROT, BILITOT, BILIDIR, LABALBU in the last 72 hours.   Lab Results   Component Value Date    MG 2.2 11/08/2014     Lab Results   Component Value Date    PROTIME 13.0 11/01/2021    INR 1.2 11/01/2021     Lab Results   Component Value Date    TSH 0.892 04/14/2014     No components found for: CHLPL  No results found for: TRIG  No results found for: HDL  No results found for: [address]      Cardiac Tests:  ECG: A resting electrocardiogram reviewed at the time of evaluation demonstrates evidence of sinus rhythm with nonspecific ST changes  Telemetry findings reviewed: Arrhythmia monitoring obtained in the postoperative care unit demonstrates evidence of sinus rhythm with occasional periods of paroxysmal atrial fibrillation, no new tachy/bradyarrhythmias overnight  Last Echocardiogram: An echocardiogram from [facility] in September, 2017 demonstrated evidence of a mildly dilated left ventricular chamber with concentric left ventricular hypertrophy and regional wall motion abnormalities of the lateral segment with mild left ventricular systolic dysfunction and estimated ejection fraction of 45 to 50% with mild right ventricular dilatation and normal right ventricular systolic function. Biatrial enlargement was present with severe left atrial enlargement and a normal mitral valve repair with a 30 mm mitral ring with trivial mitral regurgitation and a mean transmitral gradient of 8 mmHg  Last cardiac catheterization: Coronary angiography of April, 2014 demonstrated angiographically insignificant coronary atherosclerosis    ASSESSMENT / PLAN: On a clinical basis, the patient presently appears compensated from a cardiovascular standpoint with reported episodes of intraoperative wide-complex tachycardia which are presently not documented for review. He is presently asymptomatic with postoperative electrocardiographic tracings demonstrating sinus rhythm and arrhythmia monitoring demonstrating transient paroxysmal atrial fibrillation. In the absence of metabolic derangements, if arrhythmia related events were noted, the origin is presently uncertain with needs of device interrogation with the [employer] interrogation system to be obtained from the emergency room to provide device interrogation. Presently, observation has been recommended pending the review of device interrogation results with additional means of monitoring of his electrolyte status and correction of metabolic derangements to reduce risk of arrhythmia related symptomatology.   Unless contraindicated, the resumption of oral anticoagulation will be necessary to reduce risk of embolic events with needs of ongoing monitoring of his anticoagulation status and goals of maintaining prothrombin time is in the range of 2.0-3.0 times under control to reduce risk of

## 2021-11-01 NOTE — SIGNIFICANT EVENT
Patient had several minute episode of VT in OR while under GA but before procedure started. Patient had brief period of low BP systolic 80K  and received local injection with epi immediately before VT. BP responded to neosynephrine bolus and lidocaine bolus terminated VT after a few minutes. Patient with significant cardiac history.     Surgeon agrees to abort procedure pending cardiac eval.    Jose Mattson MD  Staff Anesthesiologist  November 1, 2021  4:00 PM

## 2021-11-01 NOTE — BRIEF OP NOTE
Brief Postoperative Note      Patient: Ashley Mora  YOB: 1950  MRN: [id number]    Date of Procedure: 11/1/2021    Pre-Op Diagnosis: LEFT CHOLESTEATOMA MYRINGOTOMY TUBE RIGHT EAR    SURGERY CANCELLED DUE TO ANESTHESIA CONCERNS FROM PATIENTS CARDIAC ARRHYTHMIA DURING SURGICAL PREPARATION    Surgeon(s):  Jw Staton MD    Assistant:  * No surgical staff found *    Anesthesia: General    Estimated Blood Loss (mL): NONE   Complications: SURGERY CANCELLED    Specimens:   * No specimens in log *    Implants:  * No implants in log *      Drains: * No LDAs found *    Findings: SURGERY CANCELLED    Electronically signed by Jw Staton MD on 11/1/2021 at 3:45 PM

## 2021-11-02 VITALS
HEIGHT: 70 IN | OXYGEN SATURATION: 93 % | HEART RATE: 74 BPM | DIASTOLIC BLOOD PRESSURE: 59 MMHG | WEIGHT: 205 LBS | TEMPERATURE: 98.4 F | BODY MASS INDEX: 29.35 KG/M2 | RESPIRATION RATE: 19 BRPM | SYSTOLIC BLOOD PRESSURE: 126 MMHG

## 2021-11-02 LAB
ANION GAP SERPL CALCULATED.3IONS-SCNC: 13 MMOL/L (ref 7–16)
BUN BLDV-MCNC: 34 MG/DL (ref 6–23)
CALCIUM SERPL-MCNC: 9.4 MG/DL (ref 8.6–10.2)
CHLORIDE BLD-SCNC: 105 MMOL/L (ref 98–107)
CHOLESTEROL, TOTAL: 187 MG/DL (ref 0–199)
CO2: 24 MMOL/L (ref 22–29)
CREAT SERPL-MCNC: 1.4 MG/DL (ref 0.7–1.2)
EKG ATRIAL RATE: 74 BPM
EKG P-R INTERVAL: 192 MS
EKG Q-T INTERVAL: 428 MS
EKG QRS DURATION: 94 MS
EKG QTC CALCULATION (BAZETT): 475 MS
EKG R AXIS: -4 DEGREES
EKG T AXIS: 32 DEGREES
EKG VENTRICULAR RATE: 74 BPM
GFR AFRICAN AMERICAN: >60
GFR NON-AFRICAN AMERICAN: 50 ML/MIN/1.73
GLUCOSE BLD-MCNC: 164 MG/DL (ref 74–99)
HDLC SERPL-MCNC: 33 MG/DL
LDL CHOLESTEROL CALCULATED: 126 MG/DL (ref 0–99)
POTASSIUM REFLEX MAGNESIUM: 4.5 MMOL/L (ref 3.5–5)
SODIUM BLD-SCNC: 142 MMOL/L (ref 132–146)
TRIGL SERPL-MCNC: 139 MG/DL (ref 0–149)
TSH SERPL DL<=0.05 MIU/L-ACNC: 0.28 UIU/ML (ref 0.27–4.2)
VLDLC SERPL CALC-MCNC: 28 MG/DL

## 2021-11-02 PROCEDURE — 96372 THER/PROPH/DIAG INJ SC/IM: CPT

## 2021-11-02 PROCEDURE — APPSS45 APP SPLIT SHARED TIME 31-45 MINUTES: Performed by: NURSE PRACTITIONER

## 2021-11-02 PROCEDURE — G0378 HOSPITAL OBSERVATION PER HR: HCPCS

## 2021-11-02 PROCEDURE — 99217 PR OBSERVATION CARE DISCHARGE MANAGEMENT: CPT | Performed by: FAMILY MEDICINE

## 2021-11-02 PROCEDURE — 6360000002 HC RX W HCPCS: Performed by: FAMILY MEDICINE

## 2021-11-02 PROCEDURE — 36415 COLL VENOUS BLD VENIPUNCTURE: CPT

## 2021-11-02 PROCEDURE — 84443 ASSAY THYROID STIM HORMONE: CPT

## 2021-11-02 PROCEDURE — 80048 BASIC METABOLIC PNL TOTAL CA: CPT

## 2021-11-02 PROCEDURE — 80061 LIPID PANEL: CPT

## 2021-11-02 PROCEDURE — 2700000000 HC OXYGEN THERAPY PER DAY

## 2021-11-02 PROCEDURE — 2580000003 HC RX 258: Performed by: FAMILY MEDICINE

## 2021-11-02 PROCEDURE — 93010 ELECTROCARDIOGRAM REPORT: CPT | Performed by: INTERNAL MEDICINE

## 2021-11-02 RX ORDER — WARFARIN SODIUM 3 MG/1
3 TABLET ORAL DAILY
Qty: 30 TABLET | Refills: 3 | Status: SHIPPED | OUTPATIENT
Start: 2021-11-02

## 2021-11-02 RX ADMIN — ENOXAPARIN SODIUM 40 MG: 40 INJECTION SUBCUTANEOUS at 11:20

## 2021-11-02 RX ADMIN — SODIUM CHLORIDE, PRESERVATIVE FREE 10 ML: 5 INJECTION INTRAVENOUS at 11:20

## 2021-11-02 ASSESSMENT — PAIN SCALES - GENERAL
PAINLEVEL_OUTOF10: 0

## 2021-11-02 NOTE — DISCHARGE SUMMARY
[facility] Emergency Room Physician Discharge Summary       Ale Reyez MD  [address] [place] [address] [address]     Call in 1 week  Make an appointment in 1 week to go over hospitalization,medications and follow up. ENT      Call ENT tomorrow for rescheduling of procedure and directions if can resume anticoagulation and when to hold. Activity level: As tolerated    Dispo: Home    Condition on discharge: Stable     Patient ID:  Chapo Garces  [id number]  32 y.o.  1950    Admit date: 11/1/2021    Discharge date and time:  11/2/2021  1:57 PM    Admission Diagnoses: Active Problems:    Mitral valve disease    Irregular cardiac rhythm    Preop testing    Tobacco use    H/O mitral valve replacement    Stage 3a chronic kidney disease (HCC)    Paroxysmal atrial fibrillation (HCC)    Chronic obstructive pulmonary disease ([address]  Resolved Problems:    * No resolved hospital problems. *      Discharge Diagnoses: Active Problems:    Mitral valve disease    Irregular cardiac rhythm    Preop testing    Tobacco use    H/O mitral valve replacement    Stage 3a chronic kidney disease (HCC)    Paroxysmal atrial fibrillation (HCC)    Chronic obstructive pulmonary disease ([address]  Resolved Problems:    * No resolved hospital problems. *      Consults:  IP CONSULT TO CARDIOLOGY  IP CONSULT TO HOSPITALIST  IP CONSULT TO CARDIOLOGY    Hospital Course:   Patient Chapo Garces is a 66 y.o. presented with Irregular cardiac rhythm [I49.9]    \" Patient is 67 yo male patient who follows with PCP Dr. Yogesh Farrar with a past medical history of atrial fibrillation, CHF, HTN, HLD, and tobacco use. Surgical history of multiple cardiac ablations,  pacemaker, abdominal aortic aneurysm repair, cardiac cath and inner ear surgery. Patient presented to [facility] for elective outpt ENT procedure- Left cholesteatoma myringotomy tube right ear.  He has been having significant hearing loss and following with ENT. During induction of anesthesia prior to procedure pt had a several minute episode of VTACH. Also noted that he had a brief period of low BP with systolic BP in the 96F. He received local injection with epi for bleeding from ear, neosynephrine bolus and lidocaine and VTACH was terminated. Procedure was then canceled. Pt follows at [facility] for cardiology. Patient resting in PACU eating ice chips. Reporting he feels \" fine. \" Denies chest pain or sob. Reporting he has been in usual state of health prior to procedure. Denies fevers, chills, nausea, vomiting, dysuria, hematuria, hematochezia. Reporting he last followed with Cardiology several months ago. Had cardiac cath several years ago. Not sure when last stress test. \"     Patient was monitored overnight. Cardiology was consulted. He was treated and followed for;    1.  VTACH: pt scheduled for outpt ENT procedure 11/1. After induction of general anesthesia patient had several minute run of suspected VTACH with sbp in the 62s. He received epi, neosynephrine and lidocaine bolus and terminated VTACH. Procedure was then canceled. He follows at [facility] for cardiology. He he as history of atrial fib/ flutter, h/o multiple ablations, SSS with PPPm, MVP s/p repair with biatrial maze and left atrial appendage excision, mild CAD and AAA. Cardiology was consulted and recommended observation. Cardiology stating pt compensated from a cardiac standpoint. No further testing warranted. Pacemaker was interrogated and revealed no ventricular tachyarrhythmia. Reported as transient episodes of PAF with spontaneous conversion. Per cardiology he does have clearance to proceed with surgery. Nursing discussed with ENT resident. Procedure will be rescheduled outpt. He is to call their office tomorrow to reschedule.      2. Atrial fibrillation/ flutter: pt on chronic anticoagulation- coumadin.  Has been on hold since wed for procedure. Pt to call ENT tomorrow per resident to discuss further instructions with coumadin/ reschedule procedure.      3. SSS s/p pacer     4. MVP s/p repair with biatrial maze and left atrial appendage excision     5. Recent issues with left hearing loss: following with ENT --scheduled for Left cholesteatoma myringotomy tube right ear. Procedure aborted due to concern for arrhythmia. Pt did receive cardiac clearance. Discussed with ENT and plan to reschedule procedure outpt. Pt is to call their office to reschedule and get instructions regarding anticoagulation.      6. Suspect underlying CKD vs OUSMANE; creatinine 1.4. monitor- stable.     7. Leukocytosis: 12.6.  ? Reactive. Resolved.     8. Tobacco use: smokes 1/2 ppd. Declines nicotine patch. Spent greater than 3 minutes on smoking cessation.         Discharge Exam:  General Appearance: alert and oriented to person, place and time and in no acute distress  Skin: warm and dry  Head: normocephalic and atraumatic  Neck: neck supple and non tender without mass   Pulmonary/Chest: clear to auscultation bilaterally  Cardiovascular: normal rate, normal S1 and S2 and no carotid bruits  Abdomen: soft, non-tender, non-distended, normal bowel sounds  Extremities: no cyanosis, no clubbing and no edema  Neurologic: speech normal     I/O last 3 completed shifts:  In: -   Out: 670 [Urine:670]  I/O this shift:  In: -   Out: 250 [Urine:250]      LABS:  Recent Labs     11/01/21  1145 11/01/21  1700 11/02/21  0540    142 142   K 4.7 3.5 4.5    104 105   CO2 29 26 24   BUN 35* 32* 34*   CREATININE 1.4* 1.4* 1.4*   GLUCOSE 95 104* 164*   CALCIUM 9.9 9.1 9.4       Recent Labs     11/01/21  1145 11/01/21  1700   WBC 12.6* 10.2   RBC 5.24 5.05   HGB 17.4* 16.6*   HCT 51.1 49.7   MCV 97.5 98.4   MCH 33.2 32.9   MCHC 34.1 33.4   RDW 13.4 13.6    145   MPV 9.6 9.5       No results for input(s): POCGLU in the last 72 hours. Imaging:  No results found.     Patient Instructions:      Medication List      CHANGE how you take these medications    warfarin 3 MG tablet  Commonly known as: COUMADIN  Take 1 tablet by mouth daily Hold anticoagulation. You are to call ENT office tomorrow to discuss when procedure will be rescheduled and what to do with anticoagulation.   What changed: additional instructions        CONTINUE taking these medications    azelastine 0.1 % nasal spray  Commonly known as: ASTELIN  2 sprays by Nasal route 2 times daily Use in each nostril as directed     chlorthalidone 25 MG tablet  Commonly known as: HYGROTON     Claritin 10 MG tablet  Generic drug: loratadine     therapeutic multivitamin-minerals tablet           Where to Get Your Medications      You can get these medications from any pharmacy    Bring a paper prescription for each of these medications  · warfarin 3 MG tablet           Note that more than 30 minutes was spent in preparing discharge papers, discussing discharge with patient, medication review, etc.    Signed:  Electronically signed by MAGGIE Brennan on 11/2/2021 at 1:57 PM

## 2021-11-02 NOTE — PROGRESS NOTES
Secure message sent to ENT resident re: cardiac clearance/outpt/inpt procedure. Awaiting response. Per Dr. Dean Borjas, pt to have procedure outpt, pt to call office to speak with Dr. Concepcion Barajas re: resumption of anticoagulation.        Notified Geri Alexis NP.

## 2021-11-02 NOTE — CARE COORDINATION
Met with patient- discussed plan of care and discharge planning. Pt is independent- resides at home with wife. Pt does not have any DME or assistive devices. Pt denies any discharge needs. He stated his wife will pick him up from hospital when he is discharged. Pt currently on 3 L oxygen does not wear home oxygen. He stated he has NO DME- Preference if home oxygen warranted. Spoke with nursing- they will wean.

## 2021-11-02 NOTE — PROGRESS NOTES
Call placed to [facility] Cardiology re: cardiac clearance/anticoagulation. Awaiting call back.

## 2021-11-02 NOTE — PROGRESS NOTES
[facility] Quality Flow/Interdisciplinary Rounds Progress Note        Quality Flow Rounds held on November 2, 2021    Disciplines Attending:  Bedside Nurse, ,  and Nursing Unit Leadership    Thais Marti was admitted on 11/1/2021 10:46 AM    Anticipated Discharge Date:       Disposition:    Luis M Score:  Luis M Scale Score: 21    Readmission Risk              Risk of Unplanned Readmission:  0           Discussed patient goal for the day, patient clinical progression, and barriers to discharge. The following Goal(s) of the Day/Commitment(s) have been identified:  check plan with consults, discharge planning.        Rangel Flowers RN  November 2, 2021

## 2021-11-02 NOTE — PROGRESS NOTES
Patient had 10 second run of Vtach at 1am - disclosure is in chart.     Electronically signed by Shayne Del Cid RN on 11/2/2021 at 7:14 AM

## 2021-11-02 NOTE — PROGRESS NOTES
Call placed to [address] at [phone] provided updated fax number for interrogation report.

## 2021-11-02 NOTE — PLAN OF CARE
Problem:  Activity:  Goal: Ability to tolerate increased activity will improve  Description: Ability to tolerate increased activity will improve  Reactivated  Goal: Expression of feelings of increased energy will increase  Description: Expression of feelings of increased energy will increase  Reactivated     Problem: Cardiac:  Goal: Ability to maintain an adequate cardiac output will improve  Description: Ability to maintain an adequate cardiac output will improve  Reactivated  Goal: Complications related to the disease process, condition or treatment will be avoided or minimized  Description: Complications related to the disease process, condition or treatment will be avoided or minimized  Reactivated     Problem: Coping:  Goal: Level of anxiety will decrease  Description: Level of anxiety will decrease  Reactivated  Goal: General experience of comfort will improve  Description: General experience of comfort will improve  Reactivated     Problem: Health Behavior:  Goal: Ability to manage health-related needs will improve  Description: Ability to manage health-related needs will improve  Reactivated     Problem: Safety:  Goal: Ability to remain free from injury will improve  Description: Ability to remain free from injury will improve  Reactivated  Goal: Will show no signs and symptoms of excessive bleeding  Description: Will show no signs and symptoms of excessive bleeding  Reactivated

## 2021-11-02 NOTE — PROGRESS NOTES
INPATIENT CARDIOLOGY FOLLOW-UP    Name: Wing Whalen    Age: 66 y.o. Date of Admission: 11/1/2021 10:46 AM    Date of Service: 11/2/2021    Chief Complaint: Follow-up for paroxysmal atrial fibrillation associated sinus node dysfunction, chronic diastolic heart failure, chronic obstructive lung disease, mitral valve disease, hypertension, otitis media    Interim History: The patient remains compensated from a cardiovascular standpoint with no additional significant arrhythmias. The device interrogation provided by the ROLI system documented no evidence of ventricular arrhythmias with appropriate device function and transient episodes of paroxysmal atrial fibrillation. Review of Systems: The remainder of a complete multisystem review including consitutional, central nervous, respiratory, circulatory, gastrointestinal, genitourinary, endocrinologic, hematologic, musculoskeletal and psychiatric are negative. Problem List:  Patient Active Problem List   Diagnosis    Pulmonary congestion and hypostasis    Atrial flutter (HCC)    Essential hypertension, benign    Obstructive chronic bronchitis with exacerbation (HCC)    Cardiomyopathy ([address]    Mitral regurgitation    Hypertensive cardiomyopathy ([address]    AAA (abdominal aortic aneurysm) (HCC)    Tobacco dependence    Cardiomyopathy, nonischemic    Cholesteatoma of attic of ear, left    Sensorineural hearing loss (SNHL), bilateral    Retained myringotomy tube in right ear    Irregular cardiac rhythm    Preop testing    Tobacco use    H/O mitral valve replacement    Stage 3a chronic kidney disease ([address]       Allergies:   Allergies   Allergen Reactions    Carvedilol Other (See Comments) and Shortness Of Breath    Augmentin [Amoxicillin-Pot Clavulanate] Nausea Only and Other (See Comments)     nausea       Current Medications:  Current Facility-Administered Medications   Medication Dose Route Frequency Provider Last Rate Last Admin    sodium chloride flush 0.9 % injection 5-40 mL  5-40 mL IntraVENous 2 times per day Imtiaz Solorzano MD   10 mL at 11/01/21 2058    sodium chloride flush 0.9 % injection 5-40 mL  5-40 mL IntraVENous PRN Imtiaz Solorzano MD        0.9 % sodium chloride infusion  25 mL IntraVENous PRN Imtiza Solorzano MD        enoxaparin (LOVENOX) injection 40 mg  40 mg SubCUTAneous Daily Imtiaz Solorzano MD   40 mg at 11/01/21 2059    ondansetron (ZOFRAN-ODT) disintegrating tablet 4 mg  4 mg Oral Q8H PRN Imtiaz Solorzano MD        Or    ondansetron [facility]) injection 4 mg  4 mg IntraVENous Q6H PRN Imtiaz Solorznao MD        polyethylene glycol (GLYCOLAX) packet 17 g  17 g Oral Daily PRN Imtiaz Solorzano MD        acetaminophen (TYLENOL) tablet 650 mg  650 mg Oral Q6H PRN Imtiaz Solorzano MD        Or   Kellie Se acetaminophen (TYLENOL) suppository 650 mg  650 mg Rectal Q6H PRN Imtiaz Solorzano MD        perflutren lipid microspheres (DEFINITY) injection 1.65 mg  1.5 mL IntraVENous ONCE PRN Imtiaz Solorzano MD        nicotine (NICODERM CQ) 21 MG/24HR 1 patch  1 patch TransDERmal Daily Imtiaz Solorzano MD          sodium chloride         Physical Exam:  /64   Pulse 73   Temp 98.5 °F (36.9 °C) (Oral)   Resp 18   Ht 5' 10\" (1.778 m)   Wt 205 lb (93 kg)   SpO2 96%   BMI 29.41 kg/m²   Weight change: Wt Readings from Last 3 Encounters:   11/01/21 205 lb (93 kg)   10/06/21 207 lb (93.9 kg)   09/24/21 207 lb (93.9 kg)     The patient is awake, alert and in no discomfort or distress. No gross musculoskeletal deformity is present. No significant skin or nail changes are present. Gross examination of head, eyes, nose and throat are negative. Jugular venous pressure is normal and no carotid bruits are present. Normal respiratory effort is noted with no accessory muscle usage present. Lung fields are clear to ascultation. Cardiac examination is notable for a regular rate and rhythm with no palpable thrill.  No gallop rhythm or cardiac murmur are identified. A benign abdominal examination is present with no masses or organomegaly. Intact pulses are present throughout all extremities and no peripheral edema is present. No focal neurologic deficits are present. Intake/Output:    Intake/Output Summary (Last 24 hours) at 11/2/2021 0906  Last data filed at 11/2/2021 5191  Gross per 24 hour   Intake --   Output 920 ml   Net -920 ml     I/O this shift:  In: -   Out: 250 [Urine:250]    Laboratory Tests:  Lab Results   Component Value Date    CREATININE 1.4 (H) 11/02/2021    BUN 34 (H) 11/02/2021     11/02/2021    K 4.5 11/02/2021     11/02/2021    CO2 24 11/02/2021     No results for input(s): CKTOTAL, CKMB in the last 72 hours.     Invalid input(s): TROPONONI  Lab Results   Component Value Date    BNP 4,987 (H) 02/14/2014     Lab Results   Component Value Date    WBC 10.2 11/01/2021    RBC 5.05 11/01/2021    HGB 16.6 11/01/2021    HCT 49.7 11/01/2021    MCV 98.4 11/01/2021    MCH 32.9 11/01/2021    MCHC 33.4 11/01/2021    RDW 13.6 11/01/2021     11/01/2021    MPV 9.5 11/01/2021     Recent Labs     11/01/21  1700   ALKPHOS 84   ALT 29   AST 23   PROT 7.3   BILITOT 0.4   LABALBU 4.0     Lab Results   Component Value Date    MG 2.1 11/01/2021     Lab Results   Component Value Date    PROTIME 13.0 11/01/2021    INR 1.2 11/01/2021     Lab Results   Component Value Date    TSH 0.276 11/02/2021     No components found for: CHLPL  Lab Results   Component Value Date    TRIG 139 11/02/2021     Lab Results   Component Value Date    HDL 33 11/02/2021     Lab Results   Component Value Date    LDLCALC 126 (H) 11/02/2021       Cardiac Tests:  Telemetry findings reviewed: sinus rhythm with transient paroxysmal atrial fibrillation, no new tachy/bradyarrhythmias overnight      ASSESSMENT / PLAN: On a clinical basis, the patient remains compensated from a cardiovascular standpoint with no active symptomatology and device monitoring reviewed demonstrating predominance of sinus rhythm with appropriate device function and transient episodes of paroxysmal atrial fibrillation with spontaneous conversion and no ventricular tachyarrhythmias. He has early hypokalemia has resolved with therapy and renal function remains stable. Presently no additional cardiovascular assessment is indicated with needs of resumption of anticoagulation appropriate monitoring to assure reestablishment of therapeutic anticoagulation. Continued aggressive risk factor modification of blood pressure and serum lipids will remain essential to reducing risk of future atherosclerotic development and if no contraindications exist, would recommend the initiation of high-dose atorvastatin or rosuvastatin to reduce risk of further atherosclerotic development, especially in light of his documented atherosclerotic vascular disease related to his abdominal aortic aneurysm. Appropriate antibiotic prophylaxis at time of dental interventions will be essential to reducing risk of bacterial endocarditis. We will further evaluate him should additional cardiovascular difficulties or concerns arise with his follow-up care to remain provided by his cardiologist at the [facility]. Note: This report was completed utilizing computer voice recognition software. Every effort has been made to ensure accuracy, however; inadvertent computerized transcription errors may be present. Matt Iraheta.  Enrique Vyas, [id number]

## 2021-11-02 NOTE — PROGRESS NOTES
Pt ambulated with out oxygen and pt O2 Sats remained 96%. HR remained 80s.  Pt discharged without O2

## 2021-11-03 ENCOUNTER — TELEPHONE (OUTPATIENT)
Dept: ENT CLINIC | Age: 71
End: 2021-11-03

## 2021-11-03 NOTE — TELEPHONE ENCOUNTER
Pt would like a phone call regarding what he needs to do for the cardiac work up. Ryan Noa can be reached [phone]. Thank you.

## 2021-11-03 NOTE — TELEPHONE ENCOUNTER
Patient needs complete cardiac work up. I will check with Dr. Vaishali Lezama when she feels he is cardiac stable.

## 2021-11-03 NOTE — TELEPHONE ENCOUNTER
Spoke with patient, he wants to reschedule his ear surgery. Advised him to reach out to his cardiologist and explained what happened and see if a further workup in needed. He will back me back.

## 2021-11-09 ENCOUNTER — TELEPHONE (OUTPATIENT)
Dept: ENT CLINIC | Age: 71
End: 2021-11-09

## 2021-11-09 NOTE — TELEPHONE ENCOUNTER
Patient amisha to cancel his tympanoplasty surgery with Dr. Kaylin Hoyos due to cardiac issues. Explained to follow up with cardio. Called and said he saw physician in [address] [place] [address] Road, they want him to have Echo done. Told him once that is done and all is good we will reschedule his surgery. Voiced understanding.

## 2021-11-10 ENCOUNTER — HOSPITAL ENCOUNTER (OUTPATIENT)
Dept: NON INVASIVE DIAGNOSTICS | Age: 71
Discharge: HOME | End: 2021-11-10
Payer: MEDICARE

## 2021-11-10 LAB
LV EF: 55 %
LVEF MODALITY: NORMAL

## 2021-11-10 PROCEDURE — 93306 TTE W/DOPPLER COMPLETE: CPT

## 2024-08-03 ENCOUNTER — HOSPITAL ENCOUNTER (INPATIENT)
Age: 74
LOS: 2 days | Discharge: HOME | DRG: 418 | End: 2024-08-05
Attending: EMERGENCY MEDICINE | Admitting: INTERNAL MEDICINE
Payer: MEDICARE

## 2024-08-03 ENCOUNTER — APPOINTMENT (OUTPATIENT)
Dept: ULTRASOUND IMAGING | Age: 74
DRG: 418 | End: 2024-08-03
Payer: MEDICARE

## 2024-08-03 ENCOUNTER — APPOINTMENT (OUTPATIENT)
Dept: CT IMAGING | Age: 74
DRG: 418 | End: 2024-08-03
Payer: MEDICARE

## 2024-08-03 DIAGNOSIS — E87.6 HYPOKALEMIA: ICD-10-CM

## 2024-08-03 DIAGNOSIS — R09.02 HYPOXIA: ICD-10-CM

## 2024-08-03 DIAGNOSIS — K81.9 CHOLECYSTITIS: ICD-10-CM

## 2024-08-03 DIAGNOSIS — J44.1 COPD EXACERBATION (HCC): ICD-10-CM

## 2024-08-03 DIAGNOSIS — K81.0 ACUTE CHOLECYSTITIS: Primary | ICD-10-CM

## 2024-08-03 LAB
ALBUMIN SERPL-MCNC: 3.5 G/DL (ref 3.5–5.2)
ALP SERPL-CCNC: 86 U/L (ref 40–129)
ALT SERPL-CCNC: 17 U/L (ref 0–40)
ANION GAP SERPL CALCULATED.3IONS-SCNC: 14 MMOL/L (ref 7–16)
AST SERPL-CCNC: 20 U/L (ref 0–39)
BASOPHILS # BLD: 0 K/UL (ref 0–0.2)
BASOPHILS NFR BLD: 0 % (ref 0–2)
BILIRUB DIRECT SERPL-MCNC: 0.4 MG/DL (ref 0–0.3)
BILIRUB DIRECT SERPL-MCNC: <0.2 MG/DL (ref 0–0.3)
BILIRUB SERPL-MCNC: 1.5 MG/DL (ref 0–1.2)
BNP SERPL-MCNC: 3384 PG/ML (ref 0–450)
BUN SERPL-MCNC: 36 MG/DL (ref 6–23)
CALCIUM SERPL-MCNC: 8.9 MG/DL (ref 8.6–10.2)
CHLORIDE SERPL-SCNC: 96 MMOL/L (ref 98–107)
CO2 SERPL-SCNC: 26 MMOL/L (ref 22–29)
CREAT SERPL-MCNC: 1.7 MG/DL (ref 0.7–1.2)
EKG ATRIAL RATE: 104 BPM
EKG Q-T INTERVAL: 374 MS
EKG QRS DURATION: 96 MS
EKG QTC CALCULATION (BAZETT): 489 MS
EKG R AXIS: -18 DEGREES
EKG T AXIS: 73 DEGREES
EKG VENTRICULAR RATE: 103 BPM
EOSINOPHIL # BLD: 0 K/UL (ref 0.05–0.5)
EOSINOPHILS RELATIVE PERCENT: 0 % (ref 0–6)
ERYTHROCYTE [DISTWIDTH] IN BLOOD BY AUTOMATED COUNT: 13.2 % (ref 11.5–15)
GFR, ESTIMATED: 41 ML/MIN/1.73M2
GLUCOSE SERPL-MCNC: 155 MG/DL (ref 74–99)
HCT VFR BLD AUTO: 46.1 % (ref 37–54)
HGB BLD-MCNC: 16.3 G/DL (ref 12.5–16.5)
INFLUENZA A BY PCR: NOT DETECTED
INFLUENZA B BY PCR: NOT DETECTED
INR PPP: 2.9
LACTATE BLDV-SCNC: 1.7 MMOL/L (ref 0.5–2.2)
LIPASE SERPL-CCNC: 11 U/L (ref 13–60)
LYMPHOCYTES NFR BLD: 1.55 K/UL (ref 1.5–4)
LYMPHOCYTES RELATIVE PERCENT: 6 % (ref 20–42)
MCH RBC QN AUTO: 33.5 PG (ref 26–35)
MCHC RBC AUTO-ENTMCNC: 35.4 G/DL (ref 32–34.5)
MCV RBC AUTO: 94.9 FL (ref 80–99.9)
MONOCYTES NFR BLD: 1.55 K/UL (ref 0.1–0.95)
MONOCYTES NFR BLD: 6 % (ref 2–12)
NEUTROPHILS NFR BLD: 88 % (ref 43–80)
NEUTS SEG NFR BLD: 22.31 K/UL (ref 1.8–7.3)
PLATELET # BLD AUTO: 157 K/UL (ref 130–450)
PMV BLD AUTO: 9.8 FL (ref 7–12)
POTASSIUM SERPL-SCNC: 3 MMOL/L (ref 3.5–5)
PROT SERPL-MCNC: 7.2 G/DL (ref 6.4–8.3)
PROTHROMBIN TIME: 31.7 SEC (ref 9.3–12.4)
RBC # BLD AUTO: 4.86 M/UL (ref 3.8–5.8)
RBC # BLD: NORMAL 10*6/UL
SARS-COV-2 RDRP RESP QL NAA+PROBE: NOT DETECTED
SODIUM SERPL-SCNC: 136 MMOL/L (ref 132–146)
SPECIMEN DESCRIPTION: NORMAL
TROPONIN I SERPL HS-MCNC: 54 NG/L (ref 0–11)
TROPONIN I SERPL HS-MCNC: 57 NG/L (ref 0–11)
WBC OTHER # BLD: 25.4 K/UL (ref 4.5–11.5)

## 2024-08-03 PROCEDURE — 85610 PROTHROMBIN TIME: CPT

## 2024-08-03 PROCEDURE — 87635 SARS-COV-2 COVID-19 AMP PRB: CPT

## 2024-08-03 PROCEDURE — 6370000000 HC RX 637 (ALT 250 FOR IP): Performed by: EMERGENCY MEDICINE

## 2024-08-03 PROCEDURE — 0FT44ZZ RESECTION OF GALLBLADDER, PERCUTANEOUS ENDOSCOPIC APPROACH: ICD-10-PCS | Performed by: INTERNAL MEDICINE

## 2024-08-03 PROCEDURE — 2580000003 HC RX 258

## 2024-08-03 PROCEDURE — 96374 THER/PROPH/DIAG INJ IV PUSH: CPT

## 2024-08-03 PROCEDURE — 1200000000 HC SEMI PRIVATE

## 2024-08-03 PROCEDURE — 87502 INFLUENZA DNA AMP PROBE: CPT

## 2024-08-03 PROCEDURE — 6370000000 HC RX 637 (ALT 250 FOR IP)

## 2024-08-03 PROCEDURE — 2580000003 HC RX 258: Performed by: STUDENT IN AN ORGANIZED HEALTH CARE EDUCATION/TRAINING PROGRAM

## 2024-08-03 PROCEDURE — 6360000004 HC RX CONTRAST MEDICATION: Performed by: RADIOLOGY

## 2024-08-03 PROCEDURE — 82248 BILIRUBIN DIRECT: CPT

## 2024-08-03 PROCEDURE — 2580000003 HC RX 258: Performed by: INTERNAL MEDICINE

## 2024-08-03 PROCEDURE — 76705 ECHO EXAM OF ABDOMEN: CPT

## 2024-08-03 PROCEDURE — 2580000003 HC RX 258: Performed by: EMERGENCY MEDICINE

## 2024-08-03 PROCEDURE — 99285 EMERGENCY DEPT VISIT HI MDM: CPT

## 2024-08-03 PROCEDURE — 71275 CT ANGIOGRAPHY CHEST: CPT

## 2024-08-03 PROCEDURE — 6370000000 HC RX 637 (ALT 250 FOR IP): Performed by: INTERNAL MEDICINE

## 2024-08-03 PROCEDURE — 74174 CTA ABD&PLVS W/CONTRAST: CPT

## 2024-08-03 PROCEDURE — 87040 BLOOD CULTURE FOR BACTERIA: CPT

## 2024-08-03 PROCEDURE — 6360000002 HC RX W HCPCS

## 2024-08-03 PROCEDURE — 84484 ASSAY OF TROPONIN QUANT: CPT

## 2024-08-03 PROCEDURE — 93005 ELECTROCARDIOGRAM TRACING: CPT

## 2024-08-03 PROCEDURE — 83880 ASSAY OF NATRIURETIC PEPTIDE: CPT

## 2024-08-03 PROCEDURE — 94640 AIRWAY INHALATION TREATMENT: CPT

## 2024-08-03 PROCEDURE — 99223 1ST HOSP IP/OBS HIGH 75: CPT | Performed by: INTERNAL MEDICINE

## 2024-08-03 PROCEDURE — 80053 COMPREHEN METABOLIC PANEL: CPT

## 2024-08-03 PROCEDURE — 6360000002 HC RX W HCPCS: Performed by: INTERNAL MEDICINE

## 2024-08-03 PROCEDURE — 6360000002 HC RX W HCPCS: Performed by: STUDENT IN AN ORGANIZED HEALTH CARE EDUCATION/TRAINING PROGRAM

## 2024-08-03 PROCEDURE — BF141ZZ FLUOROSCOPY OF GALLBLADDER, BILE DUCTS AND PANCREATIC DUCTS USING LOW OSMOLAR CONTRAST: ICD-10-PCS | Performed by: INTERNAL MEDICINE

## 2024-08-03 PROCEDURE — 36415 COLL VENOUS BLD VENIPUNCTURE: CPT

## 2024-08-03 PROCEDURE — 83690 ASSAY OF LIPASE: CPT

## 2024-08-03 PROCEDURE — 83605 ASSAY OF LACTIC ACID: CPT

## 2024-08-03 PROCEDURE — 85025 COMPLETE CBC W/AUTO DIFF WBC: CPT

## 2024-08-03 PROCEDURE — 93010 ELECTROCARDIOGRAM REPORT: CPT | Performed by: INTERNAL MEDICINE

## 2024-08-03 RX ORDER — MAGNESIUM SULFATE IN WATER 40 MG/ML
2000 INJECTION, SOLUTION INTRAVENOUS PRN
Status: DISCONTINUED | OUTPATIENT
Start: 2024-08-03 | End: 2024-08-05 | Stop reason: HOSPADM

## 2024-08-03 RX ORDER — ACETAMINOPHEN 650 MG/1
650 SUPPOSITORY RECTAL EVERY 6 HOURS PRN
Status: DISCONTINUED | OUTPATIENT
Start: 2024-08-03 | End: 2024-08-05 | Stop reason: HOSPADM

## 2024-08-03 RX ORDER — IOPAMIDOL 755 MG/ML
75 INJECTION, SOLUTION INTRAVASCULAR
Status: COMPLETED | OUTPATIENT
Start: 2024-08-03 | End: 2024-08-03

## 2024-08-03 RX ORDER — SODIUM CHLORIDE, SODIUM LACTATE, POTASSIUM CHLORIDE, CALCIUM CHLORIDE 600; 310; 30; 20 MG/100ML; MG/100ML; MG/100ML; MG/100ML
INJECTION, SOLUTION INTRAVENOUS
Status: DISPENSED
Start: 2024-08-03 | End: 2024-08-03

## 2024-08-03 RX ORDER — METRONIDAZOLE 500 MG/100ML
500 INJECTION, SOLUTION INTRAVENOUS EVERY 8 HOURS
Status: DISCONTINUED | OUTPATIENT
Start: 2024-08-03 | End: 2024-08-03 | Stop reason: SDUPTHER

## 2024-08-03 RX ORDER — SODIUM CHLORIDE, SODIUM LACTATE, POTASSIUM CHLORIDE, CALCIUM CHLORIDE 600; 310; 30; 20 MG/100ML; MG/100ML; MG/100ML; MG/100ML
INJECTION, SOLUTION INTRAVENOUS CONTINUOUS
Status: DISCONTINUED | OUTPATIENT
Start: 2024-08-03 | End: 2024-08-03

## 2024-08-03 RX ORDER — ACETAMINOPHEN 325 MG/1
650 TABLET ORAL EVERY 6 HOURS PRN
Status: DISCONTINUED | OUTPATIENT
Start: 2024-08-03 | End: 2024-08-05 | Stop reason: HOSPADM

## 2024-08-03 RX ORDER — ONDANSETRON 4 MG/1
4 TABLET, ORALLY DISINTEGRATING ORAL EVERY 8 HOURS PRN
Status: DISCONTINUED | OUTPATIENT
Start: 2024-08-03 | End: 2024-08-05 | Stop reason: HOSPADM

## 2024-08-03 RX ORDER — METRONIDAZOLE 500 MG/100ML
500 INJECTION, SOLUTION INTRAVENOUS EVERY 8 HOURS
Status: DISCONTINUED | OUTPATIENT
Start: 2024-08-03 | End: 2024-08-05 | Stop reason: HOSPADM

## 2024-08-03 RX ORDER — POTASSIUM CHLORIDE 7.45 MG/ML
10 INJECTION INTRAVENOUS PRN
Status: DISCONTINUED | OUTPATIENT
Start: 2024-08-03 | End: 2024-08-05 | Stop reason: HOSPADM

## 2024-08-03 RX ORDER — ONDANSETRON 2 MG/ML
4 INJECTION INTRAMUSCULAR; INTRAVENOUS EVERY 6 HOURS PRN
Status: DISCONTINUED | OUTPATIENT
Start: 2024-08-03 | End: 2024-08-05 | Stop reason: HOSPADM

## 2024-08-03 RX ORDER — AZELASTINE 1 MG/ML
2 SPRAY, METERED NASAL 2 TIMES DAILY
Status: DISCONTINUED | OUTPATIENT
Start: 2024-08-03 | End: 2024-08-03

## 2024-08-03 RX ORDER — 0.9 % SODIUM CHLORIDE 0.9 %
1000 INTRAVENOUS SOLUTION INTRAVENOUS ONCE
Status: COMPLETED | OUTPATIENT
Start: 2024-08-03 | End: 2024-08-03

## 2024-08-03 RX ORDER — SODIUM CHLORIDE 0.9 % (FLUSH) 0.9 %
5-40 SYRINGE (ML) INJECTION EVERY 12 HOURS SCHEDULED
Status: DISCONTINUED | OUTPATIENT
Start: 2024-08-03 | End: 2024-08-05 | Stop reason: HOSPADM

## 2024-08-03 RX ORDER — METRONIDAZOLE 500 MG/100ML
500 INJECTION, SOLUTION INTRAVENOUS ONCE
Status: COMPLETED | OUTPATIENT
Start: 2024-08-03 | End: 2024-08-03

## 2024-08-03 RX ORDER — SODIUM CHLORIDE 0.9 % (FLUSH) 0.9 %
5-40 SYRINGE (ML) INJECTION PRN
Status: DISCONTINUED | OUTPATIENT
Start: 2024-08-03 | End: 2024-08-05 | Stop reason: HOSPADM

## 2024-08-03 RX ORDER — POLYETHYLENE GLYCOL 3350 17 G/17G
17 POWDER, FOR SOLUTION ORAL DAILY PRN
Status: DISCONTINUED | OUTPATIENT
Start: 2024-08-03 | End: 2024-08-05 | Stop reason: HOSPADM

## 2024-08-03 RX ORDER — SODIUM CHLORIDE 9 MG/ML
INJECTION, SOLUTION INTRAVENOUS PRN
Status: DISCONTINUED | OUTPATIENT
Start: 2024-08-03 | End: 2024-08-05 | Stop reason: HOSPADM

## 2024-08-03 RX ORDER — IPRATROPIUM BROMIDE AND ALBUTEROL SULFATE 2.5; .5 MG/3ML; MG/3ML
3 SOLUTION RESPIRATORY (INHALATION) ONCE
Status: COMPLETED | OUTPATIENT
Start: 2024-08-03 | End: 2024-08-03

## 2024-08-03 RX ORDER — POTASSIUM CHLORIDE 1500 MG/1
40 TABLET, EXTENDED RELEASE ORAL PRN
Status: DISCONTINUED | OUTPATIENT
Start: 2024-08-03 | End: 2024-08-05 | Stop reason: HOSPADM

## 2024-08-03 RX ADMIN — Medication 10 ML: at 21:09

## 2024-08-03 RX ADMIN — SODIUM CHLORIDE 1000 ML: 9 INJECTION, SOLUTION INTRAVENOUS at 08:35

## 2024-08-03 RX ADMIN — PHYTONADIONE 10 MG: 10 INJECTION, EMULSION INTRAMUSCULAR; INTRAVENOUS; SUBCUTANEOUS at 13:11

## 2024-08-03 RX ADMIN — POTASSIUM BICARBONATE 50 MEQ: 978 TABLET, EFFERVESCENT ORAL at 08:37

## 2024-08-03 RX ADMIN — METRONIDAZOLE 500 MG: 500 INJECTION, SOLUTION INTRAVENOUS at 12:08

## 2024-08-03 RX ADMIN — METRONIDAZOLE 500 MG: 500 INJECTION, SOLUTION INTRAVENOUS at 21:07

## 2024-08-03 RX ADMIN — CEFTRIAXONE SODIUM 2000 MG: 2 INJECTION, POWDER, FOR SOLUTION INTRAMUSCULAR; INTRAVENOUS at 10:56

## 2024-08-03 RX ADMIN — IPRATROPIUM BROMIDE AND ALBUTEROL SULFATE 3 DOSE: .5; 2.5 SOLUTION RESPIRATORY (INHALATION) at 07:48

## 2024-08-03 RX ADMIN — ACETAMINOPHEN 650 MG: 325 TABLET ORAL at 14:24

## 2024-08-03 RX ADMIN — IOPAMIDOL 75 ML: 755 INJECTION, SOLUTION INTRAVENOUS at 08:34

## 2024-08-03 ASSESSMENT — PAIN DESCRIPTION - DESCRIPTORS
DESCRIPTORS: SHARP
DESCRIPTORS: DISCOMFORT
DESCRIPTORS: ACHING
DESCRIPTORS: DISCOMFORT;SHARP

## 2024-08-03 ASSESSMENT — PAIN DESCRIPTION - ORIENTATION
ORIENTATION: RIGHT
ORIENTATION: RIGHT;LOWER

## 2024-08-03 ASSESSMENT — PAIN DESCRIPTION - LOCATION
LOCATION: FLANK
LOCATION: ABDOMEN
LOCATION: ABDOMEN
LOCATION: FLANK

## 2024-08-03 ASSESSMENT — PAIN SCALES - GENERAL
PAINLEVEL_OUTOF10: 5
PAINLEVEL_OUTOF10: 7
PAINLEVEL_OUTOF10: 4
PAINLEVEL_OUTOF10: 9

## 2024-08-03 ASSESSMENT — PAIN - FUNCTIONAL ASSESSMENT
PAIN_FUNCTIONAL_ASSESSMENT: 0-10
PAIN_FUNCTIONAL_ASSESSMENT: ACTIVITIES ARE NOT PREVENTED
PAIN_FUNCTIONAL_ASSESSMENT: ACTIVITIES ARE NOT PREVENTED

## 2024-08-03 ASSESSMENT — PAIN DESCRIPTION - FREQUENCY: FREQUENCY: CONTINUOUS

## 2024-08-03 ASSESSMENT — LIFESTYLE VARIABLES
HOW MANY STANDARD DRINKS CONTAINING ALCOHOL DO YOU HAVE ON A TYPICAL DAY: 1 OR 2
HOW OFTEN DO YOU HAVE A DRINK CONTAINING ALCOHOL: MONTHLY OR LESS

## 2024-08-03 ASSESSMENT — PAIN DESCRIPTION - PAIN TYPE
TYPE: ACUTE PAIN
TYPE: ACUTE PAIN

## 2024-08-03 NOTE — CONSULTS
CHIEF COMPLAINT: Preoperative evaluation for acute cholecystitis    HISTORY OF PRESENT ILLNESS:   This is a 74-year-old male who follows with [facility].  Has not been seen by cardiology for a couple years now.  He has a past medical history of persistent atrial fibrillation/flutter, SSS s/p PPM, MVR s/p repair with biatrial maze and left atrial appendage excision, mild coronary disease, and AAA status post repair. He has tried/failed Tikosyn in the past. He is s/p PVI and AFL ablation 3/6/19. He underwent redo PVI with atypical atrial flutter ablation on 11/25/20.   Following this, he had recurrent atrial fibrillation/flutter.  Rate control strategy was chosen at that time.    At baseline, he has dyspnea on more than mild to moderate exertion.  Denies any recent episodes of chest pain.  Found to have mild coronary artery disease doing preoperative workup.  About 2 days ago, he started having abdominal pain associated with nausea and vomiting.  This got worse progressively.  He is also complaining of worsening shortness of breath mainly on account of abdominal pain.  He decided to present to the ER.  Ultrasound findings suggestive of acute cholecystitis.  General surgery consulted.  Being considered for cholecystectomy tomorrow.  I was consulted for preoperative evaluation.  Capable of ADLs.  States baseline functional capacity remains preserved.    Past Medical History:   Diagnosis Date    Atrial fibrillation (HCC)     CHF (congestive heart failure) ([facility]) Echo 2/15/14     EF 45% prob diastolic    History of cardiovascular stress test 2/17/14    lexiscan    Hyperlipidemia     Hypertension     Obstructive chronic bronchitis with exacerbation (HCC) 2/18/2014    Pneumonia     Tobacco dependence 4/10/2014       Patient Active Problem List   Diagnosis    Pulmonary congestion and hypostasis    Atrial flutter (HCC)    Essential hypertension, benign    Obstructive chronic bronchitis with exacerbation (HCC)     time. Well-developed and well-nourished. No distress.    Head: Normocephalic and atraumatic.   Eyes: EOM are normal. Pupils are equal, round, and reactive to light.   Neck: Normal range of motion.  JVP elevated at 13 to 14 cm. Carotid bruit is not present. No tracheal deviation present. No thyromegaly present.   Cardiovascular: Tachycardic with an irregularly irregular rhythm, normal heart sounds and intact distal pulses.  Exam reveals no gallop and no friction rub.  No murmur heard.  Pulmonary/Chest: Effort normal and breath sounds normal. No respiratory distress. No wheezes. No rales. No tenderness.   Abdominal: Soft. Bowel sounds are normal. No distension and no mass. No tenderness. No rebound and no guarding.   Musculoskeletal: Normal range of motion. No edema and no tenderness.   Lymphadenopathy:   No cervical adenopathy. No groin adenopathy.  Neurological: Alert and oriented to person, place, and time.   Skin: Skin is warm and dry. No rash noted. Not diaphoretic. No erythema.   Psychiatric: Normal mood and affect. Behavior is normal.     CBC:   Lab Results   Component Value Date/Time    WBC 25.4 08/03/2024 06:45 AM    RBC 4.86 08/03/2024 06:45 AM    HGB 16.3 08/03/2024 06:45 AM    HCT 46.1 08/03/2024 06:45 AM    MCV 94.9 08/03/2024 06:45 AM    MCH 33.5 08/03/2024 06:45 AM    MCHC 35.4 08/03/2024 06:45 AM    RDW 13.2 08/03/2024 06:45 AM     08/03/2024 06:45 AM    MPV 9.8 08/03/2024 06:45 AM     BMP:   Lab Results   Component Value Date/Time     08/03/2024 06:45 AM    K 3.0 08/03/2024 06:45 AM    K 4.5 11/02/2021 05:40 AM    CL 96 08/03/2024 06:45 AM    CO2 26 08/03/2024 06:45 AM    BUN 36 08/03/2024 06:45 AM    CREATININE 1.7 08/03/2024 06:45 AM    CALCIUM 8.9 08/03/2024 06:45 AM    GFRAA >60 11/02/2021 05:40 AM    LABGLOM 41 08/03/2024 06:45 AM     Magnesium:    Lab Results   Component Value Date/Time    MG 2.1 11/01/2021 05:00 PM     Cardiac Enzymes:   Lab Results   Component Value Date

## 2024-08-03 NOTE — PROGRESS NOTES
4 Eyes Skin Assessment     NAME:  Ronnie Molina  YOB: 1950  MEDICAL RECORD NUMBER:  [id number]    The patient is being assessed for  Admission    I agree that at least one RN has performed a thorough Head to Toe Skin Assessment on the patient. ALL assessment sites listed below have been assessed.      Areas assessed by both nurses:    Head, Face, Ears, Shoulders, Back, Chest, Arms, Elbows, Hands, Sacrum. Buttock, Coccyx, Ischium, and Legs. Feet and Heels        Does the Patient have a Wound? No noted wound(s)       Luis M Prevention initiated by RN: No  Wound Care Orders initiated by RN: No    Pressure Injury (Stage 3,4, Unstageable, DTI, NWPT, and Complex wounds) if present, place Wound referral order by RN under : No    New Ostomies, if present place, Ostomy referral order under : No     Nurse 1 eSignature: Electronically signed by Helen Ross RN on 8/3/24 at 7:15 PM EDT    **SHARE this note so that the co-signing nurse can place an eSignature**    Nurse 2 eSignature: {Esignature:[id number]}

## 2024-08-03 NOTE — ED PROVIDER NOTES
[facility] EMERGENCY DEPARTMENT  EMERGENCY DEPARTMENT ENCOUNTER        Pt Name: Ronnie Molina  MRN: [id number]  Birthdate 1950  Date of evaluation: 8/3/2024  Provider: Louis Waslh DO  PCP: Renaldo Sandhu MD  Note Started: 6:36 AM EDT 8/3/24    CHIEF COMPLAINT       Chief Complaint   Patient presents with    Shortness of Breath     SOB since Tuesday, worse this morning, denies cough/fever/chills, on room air at baseline, given 2 duonebs and solumedrol by EMS    Abdominal Pain     Diffuse abdominal \"constant aching\"pain, worse in RLQ, diarrhea yesterday along with dry heaves, pain there since Tuesday, worse with palpation, denies flank pain       HISTORY OF PRESENT ILLNESS: 1 or more Elements   History received from: Patient    Ronnie Molina is a 74 y.o. male who presents to the emergency department with chief complaint of shortness of breath.  Patient states shortness of breath been gradually worsening throughout the night and has been gradually worsening since Tuesday.  He has a history for COPD.  Patient received several DuoNeb treatments prior to arrival in the emergency department and 125 mg of IV Solu-Medrol.  He states that his shortness of breath feels little bit better after that.  Patient also states he has been having diffuse abdominal pain since yesterday and has been having right lower quadrant abdominal tenderness and diarrhea since yesterday as well as some nausea without vomiting.  Nothing makes symptoms better or worse.  Has not had any falls or injuries denies any fever, chills, cough, vomiting, chest pain, hematuria or dysuria, constipation.    Nursing Notes were all reviewed and agreed with or any disagreements were addressed in the HPI.    REVIEW OF SYSTEMS :    Positives and Pertinent negatives as per HPI.    PAST MEDICAL HISTORY/Chronic Conditions Affecting Care    has a past medical history of Atrial fibrillation (HCC), CHF (congestive heart failure) (HCC)  Filt Rate 41 (*)     Total Bilirubin 1.5 (*)     All other components within normal limits   LIPASE - Abnormal; Notable for the following components:    Lipase 11 (*)     All other components within normal limits   TROPONIN - Abnormal; Notable for the following components:    Troponin, High Sensitivity 57 (*)     All other components within normal limits   PROTIME-INR - Abnormal; Notable for the following components:    Protime 31.7 (*)     All other components within normal limits   COVID-19, RAPID   INFLUENZA A + B, PCR   CULTURE, BLOOD 1   CULTURE, BLOOD 2   LACTIC ACID   BRAIN NATRIURETIC PEPTIDE   TROPONIN   BILIRUBIN, DIRECT       As interpreted by me, the above displayed labs are abnormal. All other labs obtained during this visit were within normal range or not returned as of this dictation.    EKG Interpretation  Interpreted by emergency department resident physician, Louis Walsh DO  *Please see ED Course for EKG interpretation*    RADIOLOGY:   Non-plain film images such as CT, Ultrasound and MRI are read by the radiologist. Plain radiographic images are visualized and preliminarily interpreted by the ED Provider with the below findings:      Interpretation per the Radiologist below, if available at the time of this note:    US ABDOMEN LIMITED Specify organ? GALLBLADDER   Final Result   1. Ultrasound findings are consistent with acute cholecystitis.   2. Fatty infiltration of the liver.         CTA ABDOMEN PELVIS W CONTRAST   Final Result   1. No evidence of thoracic or abdominal aortic aneurysm or dissection.   Postsurgical changes identified to the infrarenal abdominal aorta from prior   repair. There is no dissection or intimal flap.  Pre stenotic dilatation a of   the SHWETA with area of severe stenosis of the proximal portion.   2. Mild interstitial edema with some increased markings at the lung bases   suggesting atelectatic change however early infiltrate cannot be excluded.   3. Mildly distended  Tuesday and has been progressively worsening, he does not normally wear supplemental oxygen, he was given DuoNebs and Solu-Medrol by EMS.  He also notes abdominal pain that started yesterday with nausea but no pain, denies fever or chills, denies cough or congestion, denies chest pain.  Exam: Alert and oriented, heart rate is irregularly irregular, lungs are diminished throughout, abdomen is diffusely tender to palpation, no rebounding, guarding or rigidity [HH]   1016 Informed by lab that repeat troponin is 54.  No repeat to be drawn. [RW]   1056 Spoke with Dr Marie, will follow and likely take to OR in am [HH]   1056 Spoke with Dr Sandhu, will admit [HH]      ED Course User Index  [HH] Vicky Cortez DO  [RW] Louis Walsh DO        Social Determinants affecting Dx or Tx: Patient has good medical compliance and fluency as well as established follow-up with family physician.    Records Reviewed: EKG from 11/1/2021 reviewed in comparison to today's and echocardiogram from 11/10/2021 reviewed with ejection fraction calculation of 55%.    I am the Primary Clinician of Record.    CONSULTS: (Who and What was discussed)  IP CONSULT TO GENERAL SURGERY  IP CONSULT TO CARDIOLOGY    FINAL IMPRESSION      1. Acute cholecystitis    2. Hypokalemia    3. Hypoxia    4. COPD exacerbation (HCC)          DISPOSITION/PLAN   DISPOSITION Admitted 08/03/2024 11:31:08 AM    PATIENT REFERRED TO:  No follow-up provider specified.    DISCHARGE MEDICATIONS:  New Prescriptions    No medications on file            (Please note that portions of this note were completed with a voice recognition program.  Efforts were made to edit the dictations but occasionally words are mis-transcribed.)    Louis Walsh DO (electronically signed)

## 2024-08-03 NOTE — H&P
Department of Internal Medicine  History and Physical    PCP: Dr. Renaldo Sandhu  Admitting Physician: Dr. Renaldo Sandhu  Consultants: Dr. Crenshaw      CHIEF COMPLAINT: Right upper quadrant pain    HISTORY OF PRESENT ILLNESS:    This is a very pleasant 74 years old white male who presents to the emergency department at [facility] because of abdominal pain he had similar symptoms on Tuesday patient also had mild shortness of breath but he does have history of emphysema and COPD other medical problems include atrial fibrillation congestive heart failure with reduced ejection fraction history of hyperlipidemia hypertension he did have a bout of diarrhea as well as some nausea nothing makes his symptoms worse or better he does not complain of any fever he did had some chills no vomiting no chest pain no dysuria patient will be admitted surgical consult is advised will keep him n.p.o.    PAST MEDICAL Hx:  Past Medical History:   Diagnosis Date    Atrial fibrillation (HCC)     CHF (congestive heart failure) ([facility]) Echo 2/15/14     EF 45% prob diastolic    History of cardiovascular stress test 2/17/14    lexiscan    Hyperlipidemia     Hypertension     Obstructive chronic bronchitis with exacerbation ([facility]) 2/18/2014    Pneumonia     Tobacco dependence 4/10/2014       PAST SURGICAL Hx:   Past Surgical History:   Procedure Laterality Date    ABDOMINAL AORTIC ANEURYSM REPAIR  2014    Dr. Gunderson [facility]    CARDIAC CATHETERIZATION  4/17/2014    By Dr. Ashby    COLONOSCOPY  2021    INNER EAR SURGERY  1995    INNER EAR SURGERY Right 11/1/2021    EAR TUBE REMOVAL-RIGHT. CANCELLED AFTER INDUCTION performed by Ernestina Freeman MD at [facility] OR    PACEMAKER PLACEMENT      [employer]    PILONIDAL CYST EXCISION  1970's-80's    TYMPANOPLASTY Left 11/1/2021    LEFT TYMPANOPLASTY POSSIBLE TYMPANOPLASTY  MASTOIDECTOMY. CANCELLED AFTER INDUCTION performed by Ernestina Freeman MD at [facility] OR       FAMILY Hx:  No family history on file.    HOME  on file   Intimate Partner Violence: Not on file   Housing Stability: Not on file       ROS:    Review of system 1 through 13 reviewed positive for nausea right upper quadrant pain and chills.        PHYSICAL EXAM:  VITALS:  Vitals:    08/03/24 1215   BP:    Pulse: 93   Resp: 26   Temp:    SpO2: 93%       Patient is awake alert oriented hemodynamically stable  HEENT unremarkable  Neck negative JVD negative node negative thyromegaly  Lungs decreased air entry bilaterally  Cardiac examination S1-S2 normal irregularly irregular  Abdomen right upper quadrant tenderness no guarding or rigidity plus bowel sounds all quadrants  Extremities without edema clubbing cyanosis  Neuro nonfocal cranial nerves II to XII intact  Psych unremarkable  Integument warm and dry    LABORATORY DATA:  CBC with Differential:    Lab Results   Component Value Date/Time    WBC 25.4 08/03/2024 06:45 AM    RBC 4.86 08/03/2024 06:45 AM    HGB 16.3 08/03/2024 06:45 AM    HCT 46.1 08/03/2024 06:45 AM     08/03/2024 06:45 AM    MCV 94.9 08/03/2024 06:45 AM    MCH 33.5 08/03/2024 06:45 AM    MCHC 35.4 08/03/2024 06:45 AM    RDW 13.2 08/03/2024 06:45 AM    LYMPHOPCT 6 08/03/2024 06:45 AM    MONOPCT 6 08/03/2024 06:45 AM    EOSPCT 0 08/03/2024 06:45 AM    BASOPCT 0 08/03/2024 06:45 AM    MONOSABS 1.55 08/03/2024 06:45 AM    LYMPHSABS 1.55 08/03/2024 06:45 AM    EOSABS 0.00 08/03/2024 06:45 AM    BASOSABS 0.00 08/03/2024 06:45 AM     CMP:    Lab Results   Component Value Date/Time     08/03/2024 06:45 AM    K 3.0 08/03/2024 06:45 AM    K 4.5 11/02/2021 05:40 AM    CL 96 08/03/2024 06:45 AM    CO2 26 08/03/2024 06:45 AM    BUN 36 08/03/2024 06:45 AM    CREATININE 1.7 08/03/2024 06:45 AM    GFRAA >60 11/02/2021 05:40 AM    LABGLOM 41 08/03/2024 06:45 AM    GLUCOSE 155 08/03/2024 06:45 AM    CALCIUM 8.9 08/03/2024 06:45 AM    BILITOT 1.5 08/03/2024 06:45 AM    ALKPHOS 86 08/03/2024 06:45 AM    AST 20 08/03/2024 06:45 AM    ALT 17 08/03/2024

## 2024-08-04 ENCOUNTER — ANESTHESIA (OUTPATIENT)
Dept: OPERATING ROOM | Age: 74
End: 2024-08-04
Payer: MEDICARE

## 2024-08-04 ENCOUNTER — APPOINTMENT (OUTPATIENT)
Dept: GENERAL RADIOLOGY | Age: 74
DRG: 418 | End: 2024-08-04
Payer: MEDICARE

## 2024-08-04 ENCOUNTER — ANESTHESIA EVENT (OUTPATIENT)
Dept: OPERATING ROOM | Age: 74
End: 2024-08-04
Payer: MEDICARE

## 2024-08-04 PROBLEM — E66.811 OBESITY (BMI 30.0-34.9): Status: ACTIVE | Noted: 2024-08-04

## 2024-08-04 PROBLEM — I48.21 PERMANENT ATRIAL FIBRILLATION (HCC): Status: ACTIVE | Noted: 2024-08-04

## 2024-08-04 PROBLEM — E66.9 OBESITY (BMI 30.0-34.9): Status: ACTIVE | Noted: 2024-08-04

## 2024-08-04 PROBLEM — I50.9 ACUTE DECOMPENSATED HEART FAILURE (HCC): Status: ACTIVE | Noted: 2024-08-04

## 2024-08-04 PROBLEM — Z98.890 S/P MVR (MITRAL VALVE REPAIR): Status: ACTIVE | Noted: 2024-08-04

## 2024-08-04 LAB
ALBUMIN SERPL-MCNC: 3.2 G/DL (ref 3.5–5.2)
ALP SERPL-CCNC: 88 U/L (ref 40–129)
ALT SERPL-CCNC: 17 U/L (ref 0–40)
ANION GAP SERPL CALCULATED.3IONS-SCNC: 12 MMOL/L (ref 7–16)
AST SERPL-CCNC: 18 U/L (ref 0–39)
BASOPHILS # BLD: 0 K/UL (ref 0–0.2)
BASOPHILS NFR BLD: 0 % (ref 0–2)
BILIRUB SERPL-MCNC: 0.5 MG/DL (ref 0–1.2)
BUN SERPL-MCNC: 49 MG/DL (ref 6–23)
CALCIUM SERPL-MCNC: 8.9 MG/DL (ref 8.6–10.2)
CHLORIDE SERPL-SCNC: 101 MMOL/L (ref 98–107)
CO2 SERPL-SCNC: 27 MMOL/L (ref 22–29)
CREAT SERPL-MCNC: 1.4 MG/DL (ref 0.7–1.2)
EOSINOPHIL # BLD: 0 K/UL (ref 0.05–0.5)
EOSINOPHILS RELATIVE PERCENT: 0 % (ref 0–6)
ERYTHROCYTE [DISTWIDTH] IN BLOOD BY AUTOMATED COUNT: 13.3 % (ref 11.5–15)
GFR, ESTIMATED: 53 ML/MIN/1.73M2
GLUCOSE SERPL-MCNC: 119 MG/DL (ref 74–99)
HCT VFR BLD AUTO: 44.1 % (ref 37–54)
HCT VFR BLD AUTO: 46.1 % (ref 37–54)
HGB BLD-MCNC: 15.6 G/DL (ref 12.5–16.5)
HGB BLD-MCNC: 16 G/DL (ref 12.5–16.5)
INR PPP: 1.7
LYMPHOCYTES NFR BLD: 0.68 K/UL (ref 1.5–4)
LYMPHOCYTES RELATIVE PERCENT: 3 % (ref 20–42)
MCH RBC QN AUTO: 33.7 PG (ref 26–35)
MCHC RBC AUTO-ENTMCNC: 35.4 G/DL (ref 32–34.5)
MCV RBC AUTO: 95.2 FL (ref 80–99.9)
MONOCYTES NFR BLD: 1.14 K/UL (ref 0.1–0.95)
MONOCYTES NFR BLD: 5 % (ref 2–12)
NEUTROPHILS NFR BLD: 92 % (ref 43–80)
NEUTS SEG NFR BLD: 20.98 K/UL (ref 1.8–7.3)
PLATELET # BLD AUTO: 187 K/UL (ref 130–450)
PMV BLD AUTO: 10.1 FL (ref 7–12)
POTASSIUM SERPL-SCNC: 3.7 MMOL/L (ref 3.5–5)
PROT SERPL-MCNC: 6.8 G/DL (ref 6.4–8.3)
PROTHROMBIN TIME: 18.8 SEC (ref 9.3–12.4)
RBC # BLD AUTO: 4.63 M/UL (ref 3.8–5.8)
SODIUM SERPL-SCNC: 140 MMOL/L (ref 132–146)
WBC OTHER # BLD: 22.8 K/UL (ref 4.5–11.5)

## 2024-08-04 PROCEDURE — 85014 HEMATOCRIT: CPT

## 2024-08-04 PROCEDURE — 1200000000 HC SEMI PRIVATE

## 2024-08-04 PROCEDURE — 6370000000 HC RX 637 (ALT 250 FOR IP): Performed by: NURSE ANESTHETIST, CERTIFIED REGISTERED

## 2024-08-04 PROCEDURE — 99222 1ST HOSP IP/OBS MODERATE 55: CPT | Performed by: SURGERY

## 2024-08-04 PROCEDURE — 2580000003 HC RX 258: Performed by: NURSE ANESTHETIST, CERTIFIED REGISTERED

## 2024-08-04 PROCEDURE — 7100000000 HC PACU RECOVERY - FIRST 15 MIN: Performed by: SURGERY

## 2024-08-04 PROCEDURE — 36415 COLL VENOUS BLD VENIPUNCTURE: CPT

## 2024-08-04 PROCEDURE — 2500000003 HC RX 250 WO HCPCS: Performed by: SURGERY

## 2024-08-04 PROCEDURE — 85610 PROTHROMBIN TIME: CPT

## 2024-08-04 PROCEDURE — 6360000002 HC RX W HCPCS

## 2024-08-04 PROCEDURE — 6370000000 HC RX 637 (ALT 250 FOR IP): Performed by: INTERNAL MEDICINE

## 2024-08-04 PROCEDURE — 6360000002 HC RX W HCPCS: Performed by: SURGERY

## 2024-08-04 PROCEDURE — 80053 COMPREHEN METABOLIC PANEL: CPT

## 2024-08-04 PROCEDURE — 6370000000 HC RX 637 (ALT 250 FOR IP)

## 2024-08-04 PROCEDURE — 99232 SBSQ HOSP IP/OBS MODERATE 35: CPT | Performed by: INTERNAL MEDICINE

## 2024-08-04 PROCEDURE — 2580000003 HC RX 258: Performed by: SURGERY

## 2024-08-04 PROCEDURE — 85018 HEMOGLOBIN: CPT

## 2024-08-04 PROCEDURE — 3700000000 HC ANESTHESIA ATTENDED CARE: Performed by: SURGERY

## 2024-08-04 PROCEDURE — 2580000003 HC RX 258: Performed by: INTERNAL MEDICINE

## 2024-08-04 PROCEDURE — 88304 TISSUE EXAM BY PATHOLOGIST: CPT

## 2024-08-04 PROCEDURE — 6360000002 HC RX W HCPCS: Performed by: ANESTHESIOLOGY

## 2024-08-04 PROCEDURE — 47563 LAPARO CHOLECYSTECTOMY/GRAPH: CPT | Performed by: SURGERY

## 2024-08-04 PROCEDURE — 2500000003 HC RX 250 WO HCPCS: Performed by: NURSE ANESTHETIST, CERTIFIED REGISTERED

## 2024-08-04 PROCEDURE — 74300 X-RAY BILE DUCTS/PANCREAS: CPT

## 2024-08-04 PROCEDURE — 3700000001 HC ADD 15 MINUTES (ANESTHESIA): Performed by: SURGERY

## 2024-08-04 PROCEDURE — 7100000001 HC PACU RECOVERY - ADDTL 15 MIN: Performed by: SURGERY

## 2024-08-04 PROCEDURE — 6360000002 HC RX W HCPCS: Performed by: INTERNAL MEDICINE

## 2024-08-04 PROCEDURE — 3600000004 HC SURGERY LEVEL 4 BASE: Performed by: SURGERY

## 2024-08-04 PROCEDURE — 85025 COMPLETE CBC W/AUTO DIFF WBC: CPT

## 2024-08-04 PROCEDURE — C1894 INTRO/SHEATH, NON-LASER: HCPCS | Performed by: SURGERY

## 2024-08-04 PROCEDURE — 3600000014 HC SURGERY LEVEL 4 ADDTL 15MIN: Performed by: SURGERY

## 2024-08-04 PROCEDURE — 6360000002 HC RX W HCPCS: Performed by: NURSE ANESTHETIST, CERTIFIED REGISTERED

## 2024-08-04 PROCEDURE — 6370000000 HC RX 637 (ALT 250 FOR IP): Performed by: SURGERY

## 2024-08-04 PROCEDURE — 2709999900 HC NON-CHARGEABLE SUPPLY: Performed by: SURGERY

## 2024-08-04 RX ORDER — METOPROLOL TARTRATE 25 MG/1
12.5 TABLET, FILM COATED ORAL 2 TIMES DAILY
Status: DISCONTINUED | OUTPATIENT
Start: 2024-08-04 | End: 2024-08-05 | Stop reason: HOSPADM

## 2024-08-04 RX ORDER — FENTANYL CITRATE 0.05 MG/ML
50 INJECTION, SOLUTION INTRAMUSCULAR; INTRAVENOUS EVERY 5 MIN PRN
Status: DISCONTINUED | OUTPATIENT
Start: 2024-08-04 | End: 2024-08-04 | Stop reason: HOSPADM

## 2024-08-04 RX ORDER — SODIUM CHLORIDE 0.9 % (FLUSH) 0.9 %
5-40 SYRINGE (ML) INJECTION PRN
Status: DISCONTINUED | OUTPATIENT
Start: 2024-08-04 | End: 2024-08-04 | Stop reason: HOSPADM

## 2024-08-04 RX ORDER — IPRATROPIUM BROMIDE AND ALBUTEROL SULFATE 2.5; .5 MG/3ML; MG/3ML
SOLUTION RESPIRATORY (INHALATION)
Status: COMPLETED
Start: 2024-08-04 | End: 2024-08-04

## 2024-08-04 RX ORDER — PROPOFOL 10 MG/ML
INJECTION, EMULSION INTRAVENOUS PRN
Status: DISCONTINUED | OUTPATIENT
Start: 2024-08-04 | End: 2024-08-04 | Stop reason: SDUPTHER

## 2024-08-04 RX ORDER — ONDANSETRON 2 MG/ML
4 INJECTION INTRAMUSCULAR; INTRAVENOUS
Status: DISCONTINUED | OUTPATIENT
Start: 2024-08-04 | End: 2024-08-04 | Stop reason: HOSPADM

## 2024-08-04 RX ORDER — SODIUM CHLORIDE 0.9 % (FLUSH) 0.9 %
5-40 SYRINGE (ML) INJECTION EVERY 12 HOURS SCHEDULED
Status: DISCONTINUED | OUTPATIENT
Start: 2024-08-04 | End: 2024-08-04 | Stop reason: HOSPADM

## 2024-08-04 RX ORDER — SODIUM CHLORIDE, SODIUM LACTATE, POTASSIUM CHLORIDE, CALCIUM CHLORIDE 600; 310; 30; 20 MG/100ML; MG/100ML; MG/100ML; MG/100ML
INJECTION, SOLUTION INTRAVENOUS CONTINUOUS PRN
Status: DISCONTINUED | OUTPATIENT
Start: 2024-08-04 | End: 2024-08-04 | Stop reason: SDUPTHER

## 2024-08-04 RX ORDER — OXYCODONE AND ACETAMINOPHEN 5; 325 MG/1; MG/1
1 TABLET ORAL EVERY 4 HOURS PRN
Status: DISCONTINUED | OUTPATIENT
Start: 2024-08-04 | End: 2024-08-05 | Stop reason: HOSPADM

## 2024-08-04 RX ORDER — IPRATROPIUM BROMIDE AND ALBUTEROL SULFATE 2.5; .5 MG/3ML; MG/3ML
1 SOLUTION RESPIRATORY (INHALATION)
Status: COMPLETED | OUTPATIENT
Start: 2024-08-04 | End: 2024-08-04

## 2024-08-04 RX ORDER — WARFARIN SODIUM 3 MG/1
3 TABLET ORAL DAILY
Status: DISCONTINUED | OUTPATIENT
Start: 2024-08-04 | End: 2024-08-04

## 2024-08-04 RX ORDER — DEXAMETHASONE SODIUM PHOSPHATE 10 MG/ML
INJECTION, SOLUTION INTRAMUSCULAR; INTRAVENOUS PRN
Status: DISCONTINUED | OUTPATIENT
Start: 2024-08-04 | End: 2024-08-04 | Stop reason: SDUPTHER

## 2024-08-04 RX ORDER — ONDANSETRON 2 MG/ML
INJECTION INTRAMUSCULAR; INTRAVENOUS PRN
Status: DISCONTINUED | OUTPATIENT
Start: 2024-08-04 | End: 2024-08-04 | Stop reason: SDUPTHER

## 2024-08-04 RX ORDER — SODIUM CHLORIDE 9 MG/ML
INJECTION, SOLUTION INTRAVENOUS PRN
Status: DISCONTINUED | OUTPATIENT
Start: 2024-08-04 | End: 2024-08-04 | Stop reason: HOSPADM

## 2024-08-04 RX ORDER — ROCURONIUM BROMIDE 10 MG/ML
INJECTION, SOLUTION INTRAVENOUS PRN
Status: DISCONTINUED | OUTPATIENT
Start: 2024-08-04 | End: 2024-08-04 | Stop reason: SDUPTHER

## 2024-08-04 RX ORDER — OXYCODONE AND ACETAMINOPHEN 5; 325 MG/1; MG/1
2 TABLET ORAL EVERY 4 HOURS PRN
Status: DISCONTINUED | OUTPATIENT
Start: 2024-08-04 | End: 2024-08-05 | Stop reason: HOSPADM

## 2024-08-04 RX ORDER — BUPIVACAINE HYDROCHLORIDE AND EPINEPHRINE 2.5; 5 MG/ML; UG/ML
INJECTION, SOLUTION EPIDURAL; INFILTRATION; INTRACAUDAL; PERINEURAL PRN
Status: DISCONTINUED | OUTPATIENT
Start: 2024-08-04 | End: 2024-08-04 | Stop reason: ALTCHOICE

## 2024-08-04 RX ORDER — METHOCARBAMOL 500 MG/1
750 TABLET, FILM COATED ORAL 4 TIMES DAILY
Status: DISCONTINUED | OUTPATIENT
Start: 2024-08-04 | End: 2024-08-05 | Stop reason: HOSPADM

## 2024-08-04 RX ORDER — ALBUTEROL SULFATE 90 UG/1
AEROSOL, METERED RESPIRATORY (INHALATION) PRN
Status: DISCONTINUED | OUTPATIENT
Start: 2024-08-04 | End: 2024-08-04 | Stop reason: SDUPTHER

## 2024-08-04 RX ORDER — LIDOCAINE HYDROCHLORIDE 20 MG/ML
INJECTION, SOLUTION INTRAVENOUS PRN
Status: DISCONTINUED | OUTPATIENT
Start: 2024-08-04 | End: 2024-08-04 | Stop reason: SDUPTHER

## 2024-08-04 RX ORDER — FENTANYL CITRATE 50 UG/ML
INJECTION, SOLUTION INTRAMUSCULAR; INTRAVENOUS PRN
Status: DISCONTINUED | OUTPATIENT
Start: 2024-08-04 | End: 2024-08-04 | Stop reason: SDUPTHER

## 2024-08-04 RX ORDER — WARFARIN SODIUM 3 MG/1
3 TABLET ORAL DAILY
Status: DISCONTINUED | OUTPATIENT
Start: 2024-08-05 | End: 2024-08-05 | Stop reason: HOSPADM

## 2024-08-04 RX ORDER — NALOXONE HYDROCHLORIDE 0.4 MG/ML
INJECTION, SOLUTION INTRAMUSCULAR; INTRAVENOUS; SUBCUTANEOUS PRN
Status: DISCONTINUED | OUTPATIENT
Start: 2024-08-04 | End: 2024-08-04 | Stop reason: HOSPADM

## 2024-08-04 RX ADMIN — LIDOCAINE HYDROCHLORIDE 50 MG: 20 INJECTION, SOLUTION INTRAVENOUS at 08:49

## 2024-08-04 RX ADMIN — PHENYLEPHRINE HYDROCHLORIDE 100 MCG: 10 INJECTION INTRAVENOUS at 08:55

## 2024-08-04 RX ADMIN — ONDANSETRON 4 MG: 2 INJECTION INTRAMUSCULAR; INTRAVENOUS at 15:36

## 2024-08-04 RX ADMIN — METRONIDAZOLE 500 MG: 500 INJECTION, SOLUTION INTRAVENOUS at 15:50

## 2024-08-04 RX ADMIN — METHOCARBAMOL 750 MG: 500 TABLET ORAL at 18:17

## 2024-08-04 RX ADMIN — DEXAMETHASONE SODIUM PHOSPHATE 10 MG: 10 INJECTION, SOLUTION INTRAMUSCULAR; INTRAVENOUS at 08:51

## 2024-08-04 RX ADMIN — CEFEPIME 1000 MG: 1 INJECTION, POWDER, FOR SOLUTION INTRAMUSCULAR; INTRAVENOUS at 07:11

## 2024-08-04 RX ADMIN — FENTANYL CITRATE 100 MCG: 50 INJECTION, SOLUTION INTRAMUSCULAR; INTRAVENOUS at 09:26

## 2024-08-04 RX ADMIN — Medication 5 ML: at 08:07

## 2024-08-04 RX ADMIN — METOPROLOL TARTRATE 12.5 MG: 25 TABLET, FILM COATED ORAL at 20:50

## 2024-08-04 RX ADMIN — HYDROMORPHONE HYDROCHLORIDE 0.25 MG: 1 INJECTION, SOLUTION INTRAMUSCULAR; INTRAVENOUS; SUBCUTANEOUS at 10:05

## 2024-08-04 RX ADMIN — METHOCARBAMOL 750 MG: 500 TABLET ORAL at 13:02

## 2024-08-04 RX ADMIN — FENTANYL CITRATE 100 MCG: 50 INJECTION, SOLUTION INTRAMUSCULAR; INTRAVENOUS at 08:49

## 2024-08-04 RX ADMIN — HYDROMORPHONE HYDROCHLORIDE 0.25 MG: 1 INJECTION, SOLUTION INTRAMUSCULAR; INTRAVENOUS; SUBCUTANEOUS at 09:51

## 2024-08-04 RX ADMIN — METRONIDAZOLE 500 MG: 500 INJECTION, SOLUTION INTRAVENOUS at 05:36

## 2024-08-04 RX ADMIN — METHOCARBAMOL 750 MG: 500 TABLET ORAL at 20:49

## 2024-08-04 RX ADMIN — PROPOFOL 120 MG: 10 INJECTION, EMULSION INTRAVENOUS at 08:49

## 2024-08-04 RX ADMIN — Medication 10 ML: at 20:59

## 2024-08-04 RX ADMIN — FENTANYL CITRATE 50 MCG: 50 INJECTION, SOLUTION INTRAMUSCULAR; INTRAVENOUS at 09:07

## 2024-08-04 RX ADMIN — ALBUTEROL SULFATE 4 PUFF: 90 AEROSOL, METERED RESPIRATORY (INHALATION) at 09:21

## 2024-08-04 RX ADMIN — METRONIDAZOLE 500 MG: 500 INJECTION, SOLUTION INTRAVENOUS at 20:54

## 2024-08-04 RX ADMIN — SODIUM CHLORIDE, POTASSIUM CHLORIDE, SODIUM LACTATE AND CALCIUM CHLORIDE: 600; 310; 30; 20 INJECTION, SOLUTION INTRAVENOUS at 07:18

## 2024-08-04 RX ADMIN — METOPROLOL TARTRATE 12.5 MG: 25 TABLET, FILM COATED ORAL at 15:31

## 2024-08-04 RX ADMIN — ROCURONIUM BROMIDE 40 MG: 10 INJECTION, SOLUTION INTRAVENOUS at 08:49

## 2024-08-04 RX ADMIN — IPRATROPIUM BROMIDE AND ALBUTEROL SULFATE 1 DOSE: 2.5; .5 SOLUTION RESPIRATORY (INHALATION) at 09:37

## 2024-08-04 RX ADMIN — CEFEPIME 1000 MG: 1 INJECTION, POWDER, FOR SOLUTION INTRAMUSCULAR; INTRAVENOUS at 18:16

## 2024-08-04 RX ADMIN — ONDANSETRON 4 MG: 2 INJECTION INTRAMUSCULAR; INTRAVENOUS at 08:51

## 2024-08-04 RX ADMIN — SUGAMMADEX 150 MG: 100 INJECTION, SOLUTION INTRAVENOUS at 09:22

## 2024-08-04 ASSESSMENT — PAIN DESCRIPTION - ORIENTATION: ORIENTATION: RIGHT

## 2024-08-04 ASSESSMENT — PAIN SCALES - GENERAL
PAINLEVEL_OUTOF10: 0
PAINLEVEL_OUTOF10: 5

## 2024-08-04 ASSESSMENT — PAIN DESCRIPTION - LOCATION: LOCATION: ABDOMEN

## 2024-08-04 ASSESSMENT — PAIN DESCRIPTION - DESCRIPTORS: DESCRIPTORS: DISCOMFORT;NAGGING

## 2024-08-04 NOTE — CONSULTS
GENERAL SURGERY  CONSULT NOTE  8/4/2024    Physician Consulted: Dr. Crenshaw  Reason for Consult: Acute cholecystitis  Referring Physician: Dr. Diego MAN  Ronnie Molina is a 74 y.o. male with PMHx Afib on Coumadin and AAA s/p retroperitoneal repair @ [facility] who presents for evaluation of abdominal pain. He states the pain started in the RUQ on Tuesday after eating, but went away on it's own. He said he then ate grilled cheese before bed and Friday night he woke up with excruciating sharp pain in the RUQ without radiation. He said the pain was associated with nausea, but no vomiting. He reports passing flatus and having BM. He has never had this pain before. He denies any fevers or chills. He is on Coumadin which has been held and his INR was reversed with vitamin K yesterday. He has had no other abdominal surgeries.      Past Medical History:   Diagnosis Date    Atrial fibrillation (HCC)     CHF (congestive heart failure) (HCC) Echo 2/15/14     EF 45% prob diastolic    History of cardiovascular stress test 2/17/14    lexiscan    Hyperlipidemia     Hypertension     Obstructive chronic bronchitis with exacerbation (HCC) 2/18/2014    Pneumonia     Tobacco dependence 4/10/2014       Past Surgical History:   Procedure Laterality Date    ABDOMINAL AORTIC ANEURYSM REPAIR  2014    Dr. Gunderson [facility]    CARDIAC CATHETERIZATION  4/17/2014    By Dr. Ashby    COLONOSCOPY  2021    INNER EAR SURGERY  1995    INNER EAR SURGERY Right 11/1/2021    EAR TUBE REMOVAL-RIGHT. CANCELLED AFTER INDUCTION performed by Ernestina Freeman MD at [facility] OR    PACEMAKER PLACEMENT      [employer]    PILONIDAL CYST EXCISION  1970's-80's    TYMPANOPLASTY Left 11/1/2021    LEFT TYMPANOPLASTY POSSIBLE TYMPANOPLASTY  MASTOIDECTOMY. CANCELLED AFTER INDUCTION performed by Ernestina Freeman MD at [facility] OR       Medications Prior to Admission:    Prior to Admission medications    Medication Sig Start Date End Date Taking? Authorizing Provider   warfarin

## 2024-08-04 NOTE — PROGRESS NOTES
INPATIENT CARDIOLOGY FOLLOW-UP    Name: Ronnie Molina    Age: 74 y.o.    Date of Admission: 8/3/2024  6:38 AM    Date of Service: 8/4/2024    Primary Cardiologist: New to me follows with [facility] EP    Chief Complaint: Follow-up for perioperative manage    Interim History:  No new overnight cardiac complaints. Currently with no complaints of CP, SOB, palpitations, dizziness, or lightheadedness.  Atrial fibrillation with rates in the 100s.  Occasional PVCs    Tolerated surgery well earlier today.  Recovering well.  Feels better    Review of Systems:   Negative except as described above    Problem List:  Patient Active Problem List   Diagnosis    Pulmonary congestion and hypostasis    Atrial flutter (HCC)    Essential hypertension, benign    Obstructive chronic bronchitis with exacerbation (HCC)    Cardiomyopathy (HCC)    Mitral valve disease    Hypertensive cardiomyopathy (HCC)    AAA (abdominal aortic aneurysm) (HCC)    Tobacco dependence    Cardiomyopathy, nonischemic    Cholesteatoma of attic of ear, left    Sensorineural hearing loss (SNHL), bilateral    Retained myringotomy tube in right ear    Irregular cardiac rhythm    Tobacco use    H/O mitral valve replacement    Stage 3a chronic kidney disease (HCC)    Paroxysmal atrial fibrillation (HCC)    Chronic obstructive pulmonary disease (HCC)    Acute cholecystitis       Current Medications:    Current Facility-Administered Medications:     naloxone 0.4 mg in 10 mL sodium chloride syringe, , IntraVENous, PRN, Jeremiah Ramsey MD    sodium chloride flush 0.9 % injection 5-40 mL, 5-40 mL, IntraVENous, 2 times per day, Jeremiah Ramsey MD    sodium chloride flush 0.9 % injection 5-40 mL, 5-40 mL, IntraVENous, PRN, Jeremiah Ramsey MD    0.9 % sodium chloride infusion, , IntraVENous, PRN, Jeremiah Ramsey MD    fentaNYL (SUBLIMAZE) injection 50 mcg, 50 mcg, IntraVENous, Q5 Min PRN, Jeremiah Ramsey MD    ondansetron (ZOFRAN)

## 2024-08-04 NOTE — PROGRESS NOTES
Pulse ox was 92% on room air at rest.  Ambulated patient on room air 50 feet  Oxygen saturation was 84% on room air after ambulating about 50 feet  Oxygen applied at 4 liters and patients pulse ox came up to 96% after more than a minute. Patient said he felt SOB and was visibly SOB.  Recovery pulse ox was 93% on 4 liters of oxygen while ambulating.    Patient does not wear home O2

## 2024-08-04 NOTE — PLAN OF CARE
Problem: Safety - Adult  Goal: Free from fall injury  8/3/2024 2356 by Nanette Moctezuma, RN  Outcome: Progressing     Problem: Pain  Goal: Verbalizes/displays adequate comfort level or baseline comfort level  8/3/2024 2356 by Nanette Moctezuma, RN  Outcome: Progressing     Problem: Skin/Tissue Integrity  Goal: Absence of new skin breakdown  Description: 1.  Monitor for areas of redness and/or skin breakdown  2.  Assess vascular access sites hourly  3.  Every 4-6 hours minimum:  Change oxygen saturation probe site  4.  Every 4-6 hours:  If on nasal continuous positive airway pressure, respiratory therapy assess nares and determine need for appliance change or resting period.  8/3/2024 2356 by Nanette Moctezuma, RN  Outcome: Progressing     Problem: ABCDS Injury Assessment  Goal: Absence of physical injury  Outcome: Progressing

## 2024-08-04 NOTE — PROGRESS NOTES
Consent signed by patient for surgery. No dentures or jewelry on. Patient placed in [facility].

## 2024-08-04 NOTE — OP NOTE
[facility]  Operative Note      DATE OF PROCEDURE: 8/4/2024    SURGEON: Nav Crenshaw MD    ASSISTANT: MD Cynthia; First Rhett England    PREOPERATIVE DIAGNOSIS: Acute Cholecystitis    POSTOPERATIVE DIAGNOSIS: Acute Gangrenous Cholecystitis with ruptured gallbladder    OPERATION: Laparoscopic cholecystectomy with intraoperative cholangiogram    ANESTHESIA: General endotracheal.    ESTIMATED BLOOD LOSS: Less than 10 mL.    COMPLICATIONS: None.    FLUIDS: Crystalloid.    SPECIMEN: Gallbladder.    DESCRIPTION OF PROCEDURE: This is a 74 y.o. male increasingly symptomatic right upper quadrant epigastric pain. After being explained the risks, benefits, and alternatives of the procedure, the patient agreed to proceed. We discussed at length the risks of bleeding, biliary and liver ductal injury, need for repeat or open procedures, need for catheter drainage and prolonged hospitalization. The patient agreed to proceed.    The patient was taken to the operating room and placed supine on the operating room table, administered general anesthesia and intubated. Once the airway was secured and the patient was adequately sedated a time-out was performed to confirm the surgical site and the patient's name.     We initially made a 5-mm incision superior to the umbilicus, inserted a Veress needle, confirmed needle placement and insufflated to 15 mmHg. We then removed the Veress needle and inserted a 5-mm trocar and inserted a camera to follow, and inspected the abdomen. Upon inspection of the abdomen, there was massive inflammation around the right upper quadrant near the gallbladder. A 12-mm trocar was inserted subxiphoid just right and lateral to the falciform ligament and two more 5-mm trocars in the right upper quadrant. Atraumatic graspers were used grasp the gallbladder and retract cephalad. The gallbladder was spilling bile and necrotic consistent with gangrene and

## 2024-08-05 VITALS
TEMPERATURE: 97.8 F | BODY MASS INDEX: 28.4 KG/M2 | SYSTOLIC BLOOD PRESSURE: 134 MMHG | DIASTOLIC BLOOD PRESSURE: 62 MMHG | WEIGHT: 198.4 LBS | RESPIRATION RATE: 18 BRPM | HEART RATE: 89 BPM | OXYGEN SATURATION: 94 % | HEIGHT: 70 IN

## 2024-08-05 LAB
ALBUMIN SERPL-MCNC: 3.4 G/DL (ref 3.5–5.2)
ALP SERPL-CCNC: 85 U/L (ref 40–129)
ALT SERPL-CCNC: 37 U/L (ref 0–40)
ANION GAP SERPL CALCULATED.3IONS-SCNC: 10 MMOL/L (ref 7–16)
AST SERPL-CCNC: 31 U/L (ref 0–39)
BASOPHILS # BLD: 0.02 K/UL (ref 0–0.2)
BASOPHILS NFR BLD: 0 % (ref 0–2)
BILIRUB SERPL-MCNC: 0.4 MG/DL (ref 0–1.2)
BUN SERPL-MCNC: 58 MG/DL (ref 6–23)
CALCIUM SERPL-MCNC: 8.7 MG/DL (ref 8.6–10.2)
CHLORIDE SERPL-SCNC: 102 MMOL/L (ref 98–107)
CO2 SERPL-SCNC: 28 MMOL/L (ref 22–29)
CREAT SERPL-MCNC: 1.4 MG/DL (ref 0.7–1.2)
EOSINOPHIL # BLD: 0 K/UL (ref 0.05–0.5)
EOSINOPHILS RELATIVE PERCENT: 0 % (ref 0–6)
ERYTHROCYTE [DISTWIDTH] IN BLOOD BY AUTOMATED COUNT: 13.8 % (ref 11.5–15)
GFR, ESTIMATED: 54 ML/MIN/1.73M2
GLUCOSE SERPL-MCNC: 114 MG/DL (ref 74–99)
HCT VFR BLD AUTO: 43.2 % (ref 37–54)
HGB BLD-MCNC: 14.7 G/DL (ref 12.5–16.5)
IMM GRANULOCYTES # BLD AUTO: 0.15 K/UL (ref 0–0.58)
IMM GRANULOCYTES NFR BLD: 1 % (ref 0–5)
INR PPP: 1.7
LYMPHOCYTES NFR BLD: 0.79 K/UL (ref 1.5–4)
LYMPHOCYTES RELATIVE PERCENT: 4 % (ref 20–42)
MCH RBC QN AUTO: 33.1 PG (ref 26–35)
MCHC RBC AUTO-ENTMCNC: 34 G/DL (ref 32–34.5)
MCV RBC AUTO: 97.3 FL (ref 80–99.9)
MONOCYTES NFR BLD: 0.82 K/UL (ref 0.1–0.95)
MONOCYTES NFR BLD: 4 % (ref 2–12)
NEUTROPHILS NFR BLD: 92 % (ref 43–80)
NEUTS SEG NFR BLD: 19.34 K/UL (ref 1.8–7.3)
PLATELET # BLD AUTO: 208 K/UL (ref 130–450)
PMV BLD AUTO: 10.1 FL (ref 7–12)
POTASSIUM SERPL-SCNC: 3.7 MMOL/L (ref 3.5–5)
PROT SERPL-MCNC: 6.7 G/DL (ref 6.4–8.3)
PROTHROMBIN TIME: 18.5 SEC (ref 9.3–12.4)
RBC # BLD AUTO: 4.44 M/UL (ref 3.8–5.8)
SODIUM SERPL-SCNC: 140 MMOL/L (ref 132–146)
WBC OTHER # BLD: 21.1 K/UL (ref 4.5–11.5)

## 2024-08-05 PROCEDURE — 6360000002 HC RX W HCPCS: Performed by: SURGERY

## 2024-08-05 PROCEDURE — 6370000000 HC RX 637 (ALT 250 FOR IP): Performed by: SURGERY

## 2024-08-05 PROCEDURE — 6370000000 HC RX 637 (ALT 250 FOR IP): Performed by: INTERNAL MEDICINE

## 2024-08-05 PROCEDURE — 97530 THERAPEUTIC ACTIVITIES: CPT

## 2024-08-05 PROCEDURE — 80053 COMPREHEN METABOLIC PANEL: CPT

## 2024-08-05 PROCEDURE — 97161 PT EVAL LOW COMPLEX 20 MIN: CPT | Performed by: PHYSICAL THERAPIST

## 2024-08-05 PROCEDURE — 85610 PROTHROMBIN TIME: CPT

## 2024-08-05 PROCEDURE — 85025 COMPLETE CBC W/AUTO DIFF WBC: CPT

## 2024-08-05 PROCEDURE — 97530 THERAPEUTIC ACTIVITIES: CPT | Performed by: PHYSICAL THERAPIST

## 2024-08-05 PROCEDURE — 2580000003 HC RX 258: Performed by: SURGERY

## 2024-08-05 PROCEDURE — 97165 OT EVAL LOW COMPLEX 30 MIN: CPT

## 2024-08-05 RX ORDER — METOPROLOL TARTRATE 25 MG/1
12.5 TABLET, FILM COATED ORAL 2 TIMES DAILY
Qty: 60 TABLET | Refills: 3 | Status: ON HOLD | OUTPATIENT
Start: 2024-08-05

## 2024-08-05 RX ORDER — AZELASTINE 1 MG/ML
2 SPRAY, METERED NASAL 2 TIMES DAILY
Qty: 30 ML | Refills: 1 | Status: ON HOLD | OUTPATIENT
Start: 2024-08-05

## 2024-08-05 RX ORDER — METHOCARBAMOL 750 MG/1
750 TABLET, FILM COATED ORAL 4 TIMES DAILY
Qty: 40 TABLET | Refills: 0 | Status: SHIPPED | OUTPATIENT
Start: 2024-08-05 | End: 2024-08-15

## 2024-08-05 RX ADMIN — CEFEPIME 1000 MG: 1 INJECTION, POWDER, FOR SOLUTION INTRAMUSCULAR; INTRAVENOUS at 09:04

## 2024-08-05 RX ADMIN — WARFARIN SODIUM 3 MG: 3 TABLET ORAL at 17:13

## 2024-08-05 RX ADMIN — METHOCARBAMOL 750 MG: 500 TABLET ORAL at 09:17

## 2024-08-05 RX ADMIN — METRONIDAZOLE 500 MG: 500 INJECTION, SOLUTION INTRAVENOUS at 14:13

## 2024-08-05 RX ADMIN — METOPROLOL TARTRATE 12.5 MG: 25 TABLET, FILM COATED ORAL at 09:17

## 2024-08-05 RX ADMIN — Medication 10 ML: at 09:22

## 2024-08-05 RX ADMIN — METRONIDAZOLE 500 MG: 500 INJECTION, SOLUTION INTRAVENOUS at 06:59

## 2024-08-05 ASSESSMENT — PAIN DESCRIPTION - LOCATION: LOCATION: ABDOMEN

## 2024-08-05 ASSESSMENT — PAIN DESCRIPTION - ORIENTATION: ORIENTATION: RIGHT

## 2024-08-05 ASSESSMENT — PAIN SCALES - GENERAL: PAINLEVEL_OUTOF10: 2

## 2024-08-05 NOTE — PROGRESS NOTES
Primary Care Physician: Renaldo Sandhu MD   Admitting Physician:  Renaldo Sandhu MD  Admission date and time: 8/3/2024  6:38 AM    Room:  [address] [place] [address]  Admitting diagnosis: Acute cholecystitis [K81.0]  Hypokalemia [E87.6]  Hypoxia [R09.02]  COPD exacerbation (HCC) [J44.1]    Patient Name: Ronnie Molina  MRN: [id number]    Date of Service: 8/4/2024     Subjective:  Ronnie is a 74 y.o. male who was seen and examined today,8/4/2024, at the bedside.  Patient feels 100% better the wife was at the bedside she tells me that the gallbladder was gangrenous but they got it all.  Does not complain of any fever chills nausea vomiting    family present during my examination.    Review of system 1 through 13 reviewed positive for shortness of breath on exertion and incisional pain no nausea vomiting no fever chills    Physical Exam:  No intake/output data recorded.    Intake/Output Summary (Last 24 hours) at 8/4/2024 2028  Last data filed at 8/4/2024 1026  Gross per 24 hour   Intake 630 ml   Output 10 ml   Net 620 ml   I/O last 3 completed shifts:  In: 630 [P.O.:30; I.V.:600]  Out: 10 [Blood:10]  Patient Vitals for the past 96 hrs (Last 3 readings):   Weight   08/04/24 0342 89.6 kg (197 lb 9.6 oz)   08/03/24 0648 95.3 kg (210 lb)     Vital Signs:   Blood pressure 119/75, pulse 86, temperature 98 °F (36.7 °C), temperature source Oral, resp. rate 18, height 1.778 m (5' 10\"), weight 89.6 kg (197 lb 9.6 oz), SpO2 92 %.    Patient is awake alert oriented hemodynamically stable  HEENT unremarkable  Neck negative JVD negative node negative thyromegaly  Lungs decreased air entry bilaterally  Cardiac examination S1-S2 normal irregularly irregular  Abdomen right upper quadrant tenderness no guarding or rigidity plus bowel sounds all quadrants  Laparoscopic incisions clean  Extremities without edema clubbing cyanosis  Neuro nonfocal cranial nerves II to XII intact  Psych unremarkable  Integument warm and dry    Medication:  Scheduled    Final Result   1. No evidence of thoracic or abdominal aortic aneurysm or dissection.   Postsurgical changes identified to the infrarenal abdominal aorta from prior   repair. There is no dissection or intimal flap.  Pre stenotic dilatation a of   the SHWETA with area of severe stenosis of the proximal portion.   2. Mild interstitial edema with some increased markings at the lung bases   suggesting atelectatic change however early infiltrate cannot be excluded.   3. Mildly distended gallbladder with pericholecystic stranding and minimal   fluid. Correlation to right upper quadrant ultrasound is recommended to   exclude possible acute cholecystitis.   4. Abnormal wall thickening identified of the ascending colon with minimal   surrounding stranding. Mild colitis cannot be excluded.   5. Diverticulosis with no evidence of diverticulitis.   6. Simple cystic structures identified on the kidneys bilaterally. There is   an area of increased attenuation seen within an exophytic structure on the   right kidney suggesting proteinaceous or hemorrhagic cyst.         CTA ABDOMEN PELVIS W CONTRAST   Final Result   1. No evidence of thoracic or abdominal aortic aneurysm or dissection.   Postsurgical changes identified to the infrarenal abdominal aorta from prior   repair. There is no dissection or intimal flap.  Pre stenotic dilatation a of   the SHWETA with area of severe stenosis of the proximal portion.   2. Mild interstitial edema with some increased markings at the lung bases   suggesting atelectatic change however early infiltrate cannot be excluded.   3. Mildly distended gallbladder with pericholecystic stranding and minimal   fluid. Correlation to right upper quadrant ultrasound is recommended to   exclude possible acute cholecystitis.   4. Abnormal wall thickening identified of the ascending colon with minimal   surrounding stranding. Mild colitis cannot be excluded.   5. Diverticulosis with no evidence of diverticulitis.

## 2024-08-05 NOTE — PROGRESS NOTES
HEPATOBILIARY SURGERY  DAILY PROGRESS NOTE  8/5/2024    Subjective:  No new complaints or overnight events.  Patient reports feeling much better after surgery.  Abdominal pain has greatly improved.  He has tolerated a regular diet postoperatively    Objective:  /75   Pulse 86   Temp 98 °F (36.7 °C) (Oral)   Resp 18   Ht 1.778 m (5' 10\")   Wt 90 kg (198 lb 6.4 oz)   SpO2 92%   BMI 28.47 kg/m²     General Appearance:  awake, alert, oriented, in no acute distress  Skin:  Skin color, texture, turgor normal  Head/face:  NCAT  Eyes:  No gross abnormalities. Sclera nonicteric  Lungs/Chest:  Normal expansion. No respiratory distress.   Heart: Warm throughout. Regular rate   Abdomen:  Soft, minimal tenderness, Incisions clean, dry, and intact, mildly distended.    Extremities: Extremities warm to touch, pink      I have personally reviewed all relevant labs and imaging.    Assessment/Plan:  74 y.o. male with acute cholecystitis s/p laparoscopic cholecystectomy with IOC 8/4    -Diet as tolerated  -Pain control as needed  -No further plans for surgical intervention at this time  -Awaiting morning labs  -Okay for discharge as long as no further concern regarding this morning CBC or CMP once resulted  -Follow up as outpatient, info entered into EMR     Electronically signed by Cristopher Olivera DO on 8/5/2024 at 7:27 AM     Doing well  Gangrenous findings in surgery   DC when ok with others    Nav Crenshaw MD, MS  Minimally Invasive and Bariatric Surgery  [phone] (p)  8/5/2024  1:20 PM

## 2024-08-05 NOTE — DISCHARGE INSTRUCTIONS
Your information:  Name: Ronnie Molina  : 1950    Your instructions:    You are being discharged home. Please follow up with your PCP and take your medications as prescribed.     IF YOU EXPERIENCE ANY OF THE FOLLOWING SYMPTOMS, CHEST PAIN, SHORTNESS OF BREATH, COUGHING UP BLOOD OR BLOODY SPUTUM, STOMACH PAIN OR CRAMPING, DARK, TARRY STOOLS, LOSS OF APPETITE, GENERAL NOT FEELING WELL, SIGNS AND SYMPTOMS OF INFECTION LIKE FEVER AND OR CHILLS, PLEASE CALL DR RANGEL OR RETURN TO THE EMERGENCY ROOM.     What to do after you leave the hospital:    Recommended diet: regular diet    Recommended activity: activity as tolerated        The following personal items were collected during your admission and were returned to you:    Belongings  Dental Appliances: None  Vision - Corrective Lenses: Eyeglasses  Hearing Aid: None  Clothing: Footwear, Pants, Socks  Jewelry: None  Electronic Devices: Cell Phone  Weapons (Notify Protective Services/Security): None  Home Medications: None  Valuables Given To: Patient  Provide Name(s) of Who Valuable(s) Were Given To: Patient    Information obtained by:  By signing below, I understand that if any problems occur once I leave the hospital I am to contact PCP.  I understand and acknowledge receipt of the instructions indicated above.

## 2024-08-05 NOTE — ACP (ADVANCE CARE PLANNING)
Advance Care Planning   Healthcare Decision Maker:    Primary Decision Maker: Gretel Molina - [facility] - [phone]

## 2024-08-05 NOTE — PLAN OF CARE
Problem: Safety - Adult  Goal: Free from fall injury  Outcome: Adequate for Discharge     Problem: Pain  Goal: Verbalizes/displays adequate comfort level or baseline comfort level  Outcome: Adequate for Discharge     Problem: Skin/Tissue Integrity  Goal: Absence of new skin breakdown  Description: 1.  Monitor for areas of redness and/or skin breakdown  2.  Assess vascular access sites hourly  3.  Every 4-6 hours minimum:  Change oxygen saturation probe site  4.  Every 4-6 hours:  If on nasal continuous positive airway pressure, respiratory therapy assess nares and determine need for appliance change or resting period.  Outcome: Adequate for Discharge     Problem: ABCDS Injury Assessment  Goal: Absence of physical injury  Outcome: Adequate for Discharge     Problem: Chronic Conditions and Co-morbidities  Goal: Patient's chronic conditions and co-morbidity symptoms are monitored and maintained or improved  Outcome: Adequate for Discharge     Problem: Discharge Planning  Goal: Discharge to home or other facility with appropriate resources  Outcome: Adequate for Discharge

## 2024-08-05 NOTE — PROGRESS NOTES
Physical Therapy Initial Evaluation/Plan of Care    Room #:  0437/0437-01  Patient Name: Ronnie Molina  YOB: 1950  MRN: [id number]    Date of Service: 8/5/2024     Tentative placement recommendation: Home with Home Health Physical Therapy  Equipment recommendation: To be determined      Evaluating Physical Therapist: Lola Steel, PT #[id number]      Specific Provider Orders/Date/Referring Provider :  08/03/24 1315    PT evaluation and treat  Start:  08/03/24 1315,   End:  08/03/24 1315,   ONE TIME,   Standing Count:  1 Occurrences,   R       Last continued at transfer on Sun Aug 4, 2024 11:31 AM  Nav Crenshaw MD    Admitting Diagnosis:   Acute cholecystitis [K81.0]  Hypokalemia [E87.6]  Hypoxia [R09.02]  COPD exacerbation (HCC) [J44.1]     shortness of breath.  Patient states shortness of breath been gradually worsening throughout the night and has been gradually worsening since Tuesday  Surgery:   DATE OF PROCEDURE: 8/4/2024     SURGEON: Nav Crenshaw MD   OPERATION: Laparoscopic cholecystectomy with intraoperative cholangiogram  Visit Diagnoses         Codes    Hypokalemia     E87.6    Hypoxia     R09.02    COPD exacerbation (HCC)     J44.1    Cholecystitis     K81.9            Patient Active Problem List   Diagnosis    Pulmonary congestion and hypostasis    Atrial flutter (HCC)    Essential hypertension, benign    Obstructive chronic bronchitis with exacerbation (HCC)    Cardiomyopathy (HCC)    Mitral valve disease    Hypertensive cardiomyopathy (HCC)    AAA (abdominal aortic aneurysm) (HCC)    Tobacco dependence    Cardiomyopathy, nonischemic    Cholesteatoma of attic of ear, left    Sensorineural hearing loss (SNHL), bilateral    Retained myringotomy tube in right ear    Irregular cardiac rhythm    Preop cardiovascular exam    Tobacco use    H/O mitral valve replacement    Stage 3a chronic kidney disease (HCC)    Paroxysmal atrial fibrillation (HCC)    Chronic obstructive pulmonary  continued skilled Physical Therapy to improve functional independence and quality of life.         Patient's/ family goals   home    Time in  0900  Time out  0930    Total Treatment Time  10 minutes    Evaluation time includes thorough review of current medical information, gathering information on past medical history/social history and prior level of function, completion of standardized testing/informal observation of tasks, assessment of data, and development of Plan of care and goals.     CPT codes:  Low Complexity PT evaluation (56464)  Therapeutic activities (96109)   10 minutes  1 unit(s)    Lola Steel, PT

## 2024-08-05 NOTE — PROGRESS NOTES
OCCUPATIONAL THERAPY INITIAL EVALUATION    [facility]  [address] [place]        Date:2024                                                  Patient Name: Ronnie Molina    MRN: [id number]    : 1950    Room: [address] [place] [address]      Evaluating OT: Cecilia Lopez OTR/L; [id number]     Referring Provider and Specific Provider Orders/Date:      24 131  OT eval and treat  Start:  24 131,   End:  24 1315,   ONE TIME,   Standing Count:  1 Occurrences,   R        Last continued at transfer on Sun Aug 4, 2024 11:31 AM    Nav Crenshaw MD      Placement Recommendation: Home with no skilled occupational therapy needed after discharge from inpatient.        Diagnosis:   1. Acute cholecystitis    2. Hypokalemia    3. Hypoxia    4. COPD exacerbation (HCC)    5. Cholecystitis         Surgery: lap camille       Pertinent Medical History:       Past Medical History:   Diagnosis Date    Atrial fibrillation (HCC)     CHF (congestive heart failure) ([facility]) Echo 2/15/14     EF 45% prob diastolic    History of cardiovascular stress test 14    lexiscan    Hyperlipidemia     Hypertension     Obstructive chronic bronchitis with exacerbation (HCC) 2014    Pneumonia     Tobacco dependence 4/10/2014         Past Surgical History:   Procedure Laterality Date    ABDOMINAL AORTIC ANEURYSM REPAIR      Dr. Gunderson [facility]    CARDIAC CATHETERIZATION  2014    By Dr. Ashby    CHOLECYSTECTOMY, LAPAROSCOPIC N/A 2024    CHOLECYSTECTOMY LAPAROSCOPIC performed by Nav Crenshaw MD at [facility] OR    COLONOSCOPY      INNER EAR SURGERY      INNER EAR SURGERY Right 2021    EAR TUBE REMOVAL-RIGHT. CANCELLED AFTER INDUCTION performed by Ernestina Freeman MD at [facility] OR    PACEMAKER PLACEMENT      [employer]    PILONIDAL CYST EXCISION  's-    TYMPANOPLASTY Left 2021    LEFT TYMPANOPLASTY POSSIBLE TYMPANOPLASTY  MASTOIDECTOMY.    OT Eval Low 97165  X  1    OT Eval Medium 91718      OT Eval High 83095      OT Re-Eval 39721            ADL/Self Care 92491     Therapeutic Activities 21981  8   1    Therapeutic Ex 07466       Orthotic Management 49235       Manual 49025     Neuro Re-Ed [id number]       Non-Billable Time        Evaluation Time additionally includes thorough review of current medical information, gathering information on past medical history/social history and prior level of function, interpretation of standardized testing/informal observation of tasks, assessment of data and development of plan of care and goals.        Evaluating OT: Cecilia Lopez OTR/L; [id number]

## 2024-08-05 NOTE — CARE COORDINATION
Case Management Assessment  Initial Evaluation    Date/Time of Evaluation: 8/5/2024 11:49 AM  Assessment Completed by: Sudha Dwyer RN    If patient is discharged prior to next notation, then this note serves as note for discharge by case management.    Patient Name: Ronnie Molina                   YOB: 1950  Diagnosis: Acute cholecystitis [K81.0]  Hypokalemia [E87.6]  Hypoxia [R09.02]  COPD exacerbation (HCC) [J44.1]                   Date / Time: 8/3/2024  6:38 AM    Patient Admission Status: Inpatient   Readmission Risk (Low < 19, Mod (19-27), High > 27): Readmission Risk Score: 10.1    Current PCP: Renaldo Sandhu MD  PCP verified by CM? Yes    Chart Reviewed: Yes      History Provided by: Patient  Patient Orientation: Alert and Oriented, Person, Place, Situation    Patient Cognition: Alert    Hospitalization in the last 30 days (Readmission):  No      Advance Directives:      Code Status: Full Code   Patient's Primary Decision Maker is: Legal Next of Kin    Primary Decision Maker: Gretel Molina - Spouse - [phone]    Discharge Planning:    Patient lives with: Spouse/Significant Other Type of Home: House  Primary Care Giver: Self  Patient Support Systems include: Spouse/Significant Other   Current Financial resources:    Current community resources:    Current services prior to admission: None            Current DME:              Type of Home Care services:  None    ADLS  Prior functional level: Independent in ADLs/IADLs  Current functional level: Independent in ADLs/IADLs    PT AM-PAC:   /24  OT AM-PAC:   /24    Family can provide assistance at DC: Yes  Would you like Case Management to discuss the discharge plan with any other family members/significant others, and if so, who? No  Plans to Return to Present Housing: Yes    Potential Assistance needed at discharge: N/A            Potential DME:    Patient expects to discharge to: House  Plan for transportation at discharge:       Financial    Payor: MEDICARE / Plan: MEDICARE PART A AND B / Product Type: *No Product type* /     Potential assistance Purchasing Medications:    Meds-to-Beds request:        [id number] - [place] - [address] - P [phone] - F [phone]  [address]  [place] [address]  Phone: [phone] Fax: [phone]    [id number] - [place] [place] - [address] - P [phone] - F [phone]  [address] [place] [address]  Phone: [phone] Fax: [phone]      Notes:    Factors facilitating achievement of predicted outcomes: Family support pleasant      Additional Case Management Notes: 8/5/2024 1150 CM note: Met with patient for transition of care needs. Pt is pleasant, independent, [place] and resides with his wife. No hx DME/HHC. He is wearing o2@3LNC, no home o2. Pt plans to return home at d/c. CM will follow for possible home o2 needs. Pt is a , [facility] not [facility], and does not want [facility] notified of admission.Del TIMMONS    The Plan for Transition of Care is related to the following treatment goals of Acute cholecystitis [K81.0]  Hypokalemia [E87.6]  Hypoxia [R09.02]  COPD exacerbation (HCC) [J44.1]          The Patient and/or Patient Representative Agree with the Discharge Plan?  yes    Sudha Dwyer RN  Case Management Department

## 2024-08-08 LAB
MICROORGANISM SPEC CULT: NORMAL
MICROORGANISM SPEC CULT: NORMAL
SERVICE CMNT-IMP: NORMAL
SERVICE CMNT-IMP: NORMAL
SPECIMEN DESCRIPTION: NORMAL
SPECIMEN DESCRIPTION: NORMAL

## 2024-08-12 LAB — SURGICAL PATHOLOGY REPORT: NORMAL

## 2024-08-16 NOTE — DISCHARGE SUMMARY
Internal Medicine  Discharge Summary    NAME: Ronnie Molina  :  1950  MRN:  [id number]  PCP:Renaldo Sandhu MD    ADMITTED: 8/3/2024      DISCHARGED: 2024    ADMITTING PHYSICIAN:  Renaldo Sandhu MD    CONSULTANT(S):   IP CONSULT TO GENERAL SURGERY  IP CONSULT TO CARDIOLOGY     ADMITTING DIAGNOSIS:   Acute cholecystitis  Hypokalemia  Hypoxia  COPD exacerbation   Leukocytosis  Atrial fibrillation  chronic  Acute pain  Nausea vomiting  Emphysema/COPD  Congestive heart failure with reduced EF    DISCHARGE DIAGNOSES:   Same    HOSPITAL COURSE:   This is a 74 year old male patient that was admitted with complaints of abdominal pain and shortness of breath.  He was found to have acute cholecystitis.  He was started on IV antibiotics and general surgery was consulted.  He has laparoscopic cholecystectomy with IOC done on 2024.  The patient tolerated the surgery well.  Today he is feeling much better.  He is tolerating his diet.  He states the abdominal pain is greatly improved.  He was seen and examined and offers no new complaints.  Dr Crenshaw has signed off.  Patient was discharged to home in stable condition.    LABS::  Lab Results   Component Value Date    WBC 21.1 (H) 2024    HGB 14.7 2024    HCT 43.2 2024     2024     2024    K 3.7 2024     2024    CREATININE 1.4 (H) 2024    BUN 58 (H) 2024    CO2 28 2024    GLUCOSE 114 (H) 2024    ALT 37 2024    AST 31 2024    INR 1.7 2024    APTT 26.7 2021     Lab Results   Component Value Date    INR 1.7 2024    INR 1.7 2024    INR 2.9 2024    PROTIME 18.5 (H) 2024    PROTIME 18.8 (H) 2024    PROTIME 31.7 (H) 2024      Lab Results   Component Value Date    TSH 0.276 2021     Lab Results   Component Value Date    TRIG 139 2021     Lab Results   Component Value Date    HDL 33 2021     No components  markings at the lung bases   suggesting atelectatic change however early infiltrate cannot be excluded.   3. Mildly distended gallbladder with pericholecystic stranding and minimal   fluid. Correlation to right upper quadrant ultrasound is recommended to   exclude possible acute cholecystitis.   4. Abnormal wall thickening identified of the ascending colon with minimal   surrounding stranding. Mild colitis cannot be excluded.   5. Diverticulosis with no evidence of diverticulitis.   6. Simple cystic structures identified on the kidneys bilaterally. There is   an area of increased attenuation seen within an exophytic structure on the   right kidney suggesting proteinaceous or hemorrhagic cyst.               BRIEF PHYSICAL EXAMINATION AND LABORATORIES ON DAY OF DISCHARGE:  VITALS:  /62   Pulse 89   Temp 97.8 °F (36.6 °C) (Oral)   Resp 18   Ht 1.778 m (5' 10\")   Wt 90 kg (198 lb 6.4 oz)   SpO2 94%   BMI 28.47 kg/m²     HEENT:  PERRLA.  EOMI.  Sclera clear.  Buccal mucosa moist.    Neck:  Supple. Trachea midline. No thyromegaly. No JVD. No bruits.    Heart:  Rhythm regular, rate controlled.  No murmurs.    Lungs:  Symmetrical. Clear to auscultation bilaterally.  No wheezes. No rhonchi. No rales.    Abdomen:  Soft, minimal tenderness, Incisions clean, dry, and intact, mildly distended.     Extremities:  Peripheral pulses present.  No peripheral edema.  No ulcers.    Neurologic:  Alert x 3.  No focal deficit.  Cranial nerves grossly intact.    Skin:  No petechia. No hemorrhage. No wounds.      DISPOSITION:  The patient's condition is stable.  At this time the patient is without objective evidence of an acute process requiring continuing hospitalization or inpatient management.  They are stable for discharge with outpatient follow-up.    I have spoken with the patient and discussed the results of the current hospitalization, in addition to providing specific details for the plan of care and counseling

## 2024-08-19 ENCOUNTER — OFFICE VISIT (OUTPATIENT)
Dept: SURGERY | Age: 74
End: 2024-08-19

## 2024-08-19 VITALS
TEMPERATURE: 98 F | HEIGHT: 70 IN | SYSTOLIC BLOOD PRESSURE: 124 MMHG | DIASTOLIC BLOOD PRESSURE: 61 MMHG | BODY MASS INDEX: 28.47 KG/M2 | HEART RATE: 58 BPM

## 2024-08-19 DIAGNOSIS — K81.0 ACUTE GANGRENOUS CHOLECYSTITIS: Primary | ICD-10-CM

## 2024-08-19 PROCEDURE — 99024 POSTOP FOLLOW-UP VISIT: CPT | Performed by: SURGERY

## 2024-08-19 RX ORDER — CIPROFLOXACIN 500 MG/1
500 TABLET, FILM COATED ORAL EVERY 12 HOURS
COMMUNITY
Start: 2024-08-16

## 2024-08-19 NOTE — PROGRESS NOTES
General Surgery Office Note  [facility]  Nav Crenshaw MD, MS    Patient's Name/Date of Birth: Ronnie Molina / 1950    Date: August 19, 2024     Chief compaint: Postop visit from laparoscopic cholecystectomy    Surgeon: MD Flower    Patient Active Problem List   Diagnosis    Pulmonary congestion and hypostasis    Atrial flutter (HCC)    Essential hypertension, benign    Obstructive chronic bronchitis with exacerbation (HCC)    Cardiomyopathy (HCC)    Mitral valve disease    Hypertensive cardiomyopathy (HCC)    AAA (abdominal aortic aneurysm) (HCC)    Tobacco dependence    Cardiomyopathy, nonischemic    Cholesteatoma of attic of ear, left    Sensorineural hearing loss (SNHL), bilateral    Retained myringotomy tube in right ear    Irregular cardiac rhythm    Preop cardiovascular exam    Tobacco use    H/O mitral valve replacement    Stage 3a chronic kidney disease (HCC)    Paroxysmal atrial fibrillation (HCC)    Chronic obstructive pulmonary disease (HCC)    Acute cholecystitis    S/P MVR (mitral valve repair)    Permanent atrial fibrillation (HCC)    Obesity (BMI 30.0-34.9)    Acute decompensated heart failure (HCC)       Subjective: Doing well, no new complaints, pain prior to surgery has resolved, tolerating diet, bowel function normal, anxious to return to normal physical activity    Objective:  /61 (Site: Left Upper Arm, Position: Sitting, Cuff Size: Medium Adult)   Pulse 58   Temp 98 °F (36.7 °C)   Ht 1.778 m (5' 10\")   BMI 28.47 kg/m²   Labs:  No results for input(s): \"WBC\", \"HGB\", \"HCT\" in the last 72 hours.    Invalid input(s): \"PLR\"  Lab Results   Component Value Date    CREATININE 1.4 (H) 08/05/2024    BUN 58 (H) 08/05/2024     08/05/2024    K 3.7 08/05/2024     08/05/2024    CO2 28 08/05/2024     No results for input(s): \"LIPASE\", \"AMYLASE\" in the last 72 hours.      General appearance: AA, NAD  HEENT: NCAT, PERRLA, EOMI  Lungs: Clear, equal rise

## 2024-08-23 NOTE — PROGRESS NOTES
Physician Progress Note      PATIENT:               BRENDAN RIOJAS  CSN #:                  [id number]  :                       1950  ADMIT DATE:       8/3/2024 6:38 AM  DISCH DATE:        2024 6:03 PM  RESPONDING  PROVIDER #:        Renaldo Sandhu MD          QUERY TEXT:    Pt admitted with acute cholecystitis. Pt noted to have elevated WBC and HR. If   possible, please document in the progress notes and discharge summary if you   are evaluating and/or treating any of the following:    The medical record reflects the following:  Risk Factors: advanced age  Clinical Indicators: WBC 25.4, , temp 98.5, resp 16-22, per surgery   \"...acute cholecystitis, severe sepsis, OUSMANE, ckd, coagulopathy...\"  Treatment: IV Maxipime, Rocephin, Flagyl    Thank you,  Nena Randolph RN, BSN, CDIS  Clinical Documentation Integrity  [email or web address]  Options provided:  -- Sepsis, present on admission  -- Severe sepsis, present on admission  -- Acute cholecystitis with gangrene without Sepsis  -- Other - I will add my own diagnosis  -- Disagree - Not applicable / Not valid  -- Disagree - Clinically unable to determine / Unknown  -- Refer to Clinical Documentation Reviewer    PROVIDER RESPONSE TEXT:    This patient has acute cholecystitis with gangrene without Sepsis.    Query created by: Nena Randolph on 2024 8:48 AM      Electronically signed by:  Renaldo Sandhu MD 2024 10:07 AM

## 2024-09-01 ENCOUNTER — APPOINTMENT (OUTPATIENT)
Dept: GENERAL RADIOLOGY | Age: 74
DRG: 291 | End: 2024-09-01
Payer: MEDICARE

## 2024-09-01 ENCOUNTER — APPOINTMENT (OUTPATIENT)
Age: 74
DRG: 291 | End: 2024-09-01
Payer: MEDICARE

## 2024-09-01 ENCOUNTER — HOSPITAL ENCOUNTER (INPATIENT)
Age: 74
LOS: 2 days | Discharge: HOME | DRG: 291 | End: 2024-09-03
Attending: EMERGENCY MEDICINE | Admitting: INTERNAL MEDICINE
Payer: MEDICARE

## 2024-09-01 DIAGNOSIS — J96.02 ACUTE RESPIRATORY FAILURE WITH HYPOXIA AND HYPERCAPNIA (HCC): ICD-10-CM

## 2024-09-01 DIAGNOSIS — I50.43 CHF (CONGESTIVE HEART FAILURE), NYHA CLASS I, ACUTE ON CHRONIC, COMBINED (HCC): ICD-10-CM

## 2024-09-01 DIAGNOSIS — J44.1 ACUTE EXACERBATION OF CHRONIC OBSTRUCTIVE PULMONARY DISEASE (COPD) (HCC): ICD-10-CM

## 2024-09-01 DIAGNOSIS — I50.9 ACUTE DECOMPENSATED HEART FAILURE (HCC): Primary | ICD-10-CM

## 2024-09-01 DIAGNOSIS — I50.43 ACUTE ON CHRONIC COMBINED SYSTOLIC AND DIASTOLIC CONGESTIVE HEART FAILURE (HCC): ICD-10-CM

## 2024-09-01 DIAGNOSIS — J96.01 ACUTE RESPIRATORY FAILURE WITH HYPOXIA AND HYPERCAPNIA (HCC): ICD-10-CM

## 2024-09-01 PROBLEM — J96.22 ACUTE ON CHRONIC RESPIRATORY FAILURE WITH HYPOXIA AND HYPERCAPNIA (HCC): Status: ACTIVE | Noted: 2024-09-01

## 2024-09-01 PROBLEM — J96.21 ACUTE ON CHRONIC RESPIRATORY FAILURE WITH HYPOXIA AND HYPERCAPNIA (HCC): Status: ACTIVE | Noted: 2024-09-01

## 2024-09-01 LAB
ALBUMIN SERPL-MCNC: 4.1 G/DL (ref 3.5–5.2)
ALLEN TEST: POSITIVE
ALP SERPL-CCNC: 89 U/L (ref 40–129)
ALT SERPL-CCNC: 25 U/L (ref 0–40)
ANION GAP SERPL CALCULATED.3IONS-SCNC: 9 MMOL/L (ref 7–16)
AST SERPL-CCNC: 23 U/L (ref 0–39)
BASOPHILS # BLD: 0.03 K/UL (ref 0–0.2)
BASOPHILS NFR BLD: 0 % (ref 0–2)
BILIRUB SERPL-MCNC: 0.3 MG/DL (ref 0–1.2)
BNP SERPL-MCNC: 3740 PG/ML (ref 0–450)
BUN SERPL-MCNC: 27 MG/DL (ref 6–23)
CALCIUM SERPL-MCNC: 9 MG/DL (ref 8.6–10.2)
CHLORIDE SERPL-SCNC: 105 MMOL/L (ref 98–107)
CO2 SERPL-SCNC: 28 MMOL/L (ref 22–29)
CREAT SERPL-MCNC: 1.4 MG/DL (ref 0.7–1.2)
ECHO AO ASC DIAM: 3.2 CM
ECHO AO ASCENDING AORTA INDEX: 1.52 CM/M2
ECHO AV AREA PEAK VELOCITY: 3.4 CM2
ECHO AV AREA VTI: 3.3 CM2
ECHO AV AREA/BSA PEAK VELOCITY: 1.6 CM2/M2
ECHO AV AREA/BSA VTI: 1.6 CM2/M2
ECHO AV CUSP MM: 2.2 CM
ECHO AV MEAN GRADIENT: 4 MMHG
ECHO AV MEAN VELOCITY: 0.9 M/S
ECHO AV PEAK GRADIENT: 9 MMHG
ECHO AV PEAK VELOCITY: 1.5 M/S
ECHO AV VELOCITY RATIO: 0.67
ECHO AV VTI: 25.8 CM
ECHO BSA: 2.14 M2
ECHO EST RA PRESSURE: 15 MMHG
ECHO LA VOL A-L A2C: 132 ML (ref 18–58)
ECHO LA VOL A-L A4C: 151 ML (ref 18–58)
ECHO LA VOL BP: 132 ML (ref 18–58)
ECHO LA VOL MOD A2C: 124 ML (ref 18–58)
ECHO LA VOL MOD A4C: 140 ML (ref 18–58)
ECHO LA VOL/BSA BIPLANE: 63 ML/M2 (ref 16–34)
ECHO LA VOLUME AREA LENGTH: 143 ML
ECHO LA VOLUME INDEX A-L A2C: 63 ML/M2 (ref 16–34)
ECHO LA VOLUME INDEX A-L A4C: 72 ML/M2 (ref 16–34)
ECHO LA VOLUME INDEX AREA LENGTH: 68 ML/M2 (ref 16–34)
ECHO LA VOLUME INDEX MOD A2C: 59 ML/M2 (ref 16–34)
ECHO LA VOLUME INDEX MOD A4C: 66 ML/M2 (ref 16–34)
ECHO LV EDV A2C: 217 ML
ECHO LV EDV A4C: 119 ML
ECHO LV EDV BP: 165 ML (ref 67–155)
ECHO LV EDV INDEX A4C: 56 ML/M2
ECHO LV EDV INDEX BP: 78 ML/M2
ECHO LV EDV NDEX A2C: 103 ML/M2
ECHO LV EF PHYSICIAN: 50 %
ECHO LV EJECTION FRACTION A2C: 47 %
ECHO LV EJECTION FRACTION A4C: 35 %
ECHO LV ESV A2C: 115 ML
ECHO LV ESV A4C: 77 ML
ECHO LV ESV BP: 96 ML (ref 22–58)
ECHO LV ESV INDEX A2C: 55 ML/M2
ECHO LV ESV INDEX A4C: 36 ML/M2
ECHO LV ESV INDEX BP: 45 ML/M2
ECHO LV FRACTIONAL SHORTENING: 17 % (ref 28–44)
ECHO LV INTERNAL DIMENSION DIASTOLE INDEX: 2.8 CM/M2
ECHO LV INTERNAL DIMENSION DIASTOLIC: 5.9 CM (ref 4.2–5.9)
ECHO LV INTERNAL DIMENSION SYSTOLIC INDEX: 2.32 CM/M2
ECHO LV INTERNAL DIMENSION SYSTOLIC: 4.9 CM
ECHO LV ISOVOLUMETRIC RELAXATION TIME (IVRT): 68.5 MS
ECHO LV IVSD: 1.4 CM (ref 0.6–1)
ECHO LV IVSS: 1.7 CM
ECHO LV MASS 2D: 358.9 G (ref 88–224)
ECHO LV MASS INDEX 2D: 170.1 G/M2 (ref 49–115)
ECHO LV POSTERIOR WALL DIASTOLIC: 1.3 CM (ref 0.6–1)
ECHO LV POSTERIOR WALL SYSTOLIC: 1.6 CM
ECHO LV RELATIVE WALL THICKNESS RATIO: 0.44
ECHO LVOT AREA: 4.9 CM2
ECHO LVOT AV VTI INDEX: 0.7
ECHO LVOT DIAM: 2.5 CM
ECHO LVOT MEAN GRADIENT: 2 MMHG
ECHO LVOT PEAK GRADIENT: 4 MMHG
ECHO LVOT PEAK VELOCITY: 1 M/S
ECHO LVOT STROKE VOLUME INDEX: 42.1 ML/M2
ECHO LVOT SV: 88.8 ML
ECHO LVOT VTI: 18.1 CM
ECHO MV A VELOCITY: 0.17 M/S
ECHO MV AREA PHT: 3.1 CM2
ECHO MV AREA VTI: 1.9 CM2
ECHO MV E DECELERATION TIME (DT): 365 MS
ECHO MV E VELOCITY: 1.37 M/S
ECHO MV E/A RATIO: 8.06
ECHO MV LVOT VTI INDEX: 2.61
ECHO MV MAX VELOCITY: 1.8 M/S
ECHO MV MEAN GRADIENT: 5 MMHG
ECHO MV MEAN VELOCITY: 1 M/S
ECHO MV PEAK GRADIENT: 12 MMHG
ECHO MV PRESSURE HALF TIME (PHT): 69.9 MS
ECHO MV VTI: 47.3 CM
ECHO PV MAX VELOCITY: 1.1 M/S
ECHO PV MEAN GRADIENT: 3 MMHG
ECHO PV MEAN VELOCITY: 0.8 M/S
ECHO PV PEAK GRADIENT: 5 MMHG
ECHO PV VTI: 20.5 CM
ECHO RIGHT VENTRICULAR SYSTOLIC PRESSURE (RVSP): 49 MMHG
ECHO RV INTERNAL DIMENSION: 2.2 CM
ECHO RV LONGITUDINAL DIMENSION: 6.2 CM
ECHO RV MID DIMENSION: 1.7 CM
ECHO RV TAPSE: 2.1 CM (ref 1.7–?)
ECHO TV REGURGITANT MAX VELOCITY: 2.92 M/S
ECHO TV REGURGITANT PEAK GRADIENT: 34 MMHG
EKG ATRIAL RATE: 66 BPM
EKG Q-T INTERVAL: 336 MS
EKG QRS DURATION: 90 MS
EKG QTC CALCULATION (BAZETT): 408 MS
EKG R AXIS: 8 DEGREES
EKG T AXIS: 97 DEGREES
EKG VENTRICULAR RATE: 89 BPM
EOSINOPHIL # BLD: 0.24 K/UL (ref 0.05–0.5)
EOSINOPHILS RELATIVE PERCENT: 3 % (ref 0–6)
ERYTHROCYTE [DISTWIDTH] IN BLOOD BY AUTOMATED COUNT: 14.8 % (ref 11.5–15)
FLOW RATE: 6
GFR, ESTIMATED: 53 ML/MIN/1.73M2
GLUCOSE SERPL-MCNC: 114 MG/DL (ref 74–99)
HCT VFR BLD AUTO: 40.1 % (ref 37–54)
HGB BLD-MCNC: 13.2 G/DL (ref 12.5–16.5)
IMM GRANULOCYTES # BLD AUTO: 0.03 K/UL (ref 0–0.58)
IMM GRANULOCYTES NFR BLD: 0 % (ref 0–5)
INR PPP: 2.3
LEFT VENTRICULAR EJECTION FRACTION MODE: NORMAL
LV EF: 55 %
LYMPHOCYTES NFR BLD: 2.2 K/UL (ref 1.5–4)
LYMPHOCYTES RELATIVE PERCENT: 24 % (ref 20–42)
MCH RBC QN AUTO: 33.3 PG (ref 26–35)
MCHC RBC AUTO-ENTMCNC: 32.9 G/DL (ref 32–34.5)
MCV RBC AUTO: 101.3 FL (ref 80–99.9)
MONOCYTES NFR BLD: 0.69 K/UL (ref 0.1–0.95)
MONOCYTES NFR BLD: 7 % (ref 2–12)
NEGATIVE BASE EXCESS, ART: 2 MMOL/L
NEUTROPHILS NFR BLD: 66 % (ref 43–80)
NEUTS SEG NFR BLD: 6.19 K/UL (ref 1.8–7.3)
O2 DELIVERY DEVICE: ABNORMAL
PATIENT TEMP: 37
PLATELET # BLD AUTO: 164 K/UL (ref 130–450)
PMV BLD AUTO: 9.7 FL (ref 7–12)
POC HCO3: 25.9 MMOL/L (ref 22–26)
POC O2 SATURATION: 96.2 % (ref 92–98.5)
POC PCO2 TEMP: 56.7 MM HG
POC PCO2: 56.7 MM HG (ref 35–45)
POC PH TEMP: 7.27
POC PH: 7.27 (ref 7.35–7.45)
POC PO2 TEMP: 96.6 MM HG
POC PO2: 96.6 MM HG (ref 60–80)
POTASSIUM SERPL-SCNC: 4.3 MMOL/L (ref 3.5–5)
PROT SERPL-MCNC: 6.9 G/DL (ref 6.4–8.3)
PROTHROMBIN TIME: 25.5 SEC (ref 9.3–12.4)
RBC # BLD AUTO: 3.96 M/UL (ref 3.8–5.8)
SAMPLE SITE: ABNORMAL
SODIUM SERPL-SCNC: 142 MMOL/L (ref 132–146)
TROPONIN I SERPL HS-MCNC: 32 NG/L (ref 0–11)
TROPONIN I SERPL HS-MCNC: 35 NG/L (ref 0–11)
WBC OTHER # BLD: 9.4 K/UL (ref 4.5–11.5)

## 2024-09-01 PROCEDURE — 99223 1ST HOSP IP/OBS HIGH 75: CPT | Performed by: INTERNAL MEDICINE

## 2024-09-01 PROCEDURE — 99285 EMERGENCY DEPT VISIT HI MDM: CPT

## 2024-09-01 PROCEDURE — 84484 ASSAY OF TROPONIN QUANT: CPT

## 2024-09-01 PROCEDURE — 6370000000 HC RX 637 (ALT 250 FOR IP): Performed by: EMERGENCY MEDICINE

## 2024-09-01 PROCEDURE — 2060000000 HC ICU INTERMEDIATE R&B

## 2024-09-01 PROCEDURE — 85610 PROTHROMBIN TIME: CPT

## 2024-09-01 PROCEDURE — 93010 ELECTROCARDIOGRAM REPORT: CPT | Performed by: INTERNAL MEDICINE

## 2024-09-01 PROCEDURE — 6370000000 HC RX 637 (ALT 250 FOR IP): Performed by: INTERNAL MEDICINE

## 2024-09-01 PROCEDURE — 6360000002 HC RX W HCPCS: Performed by: EMERGENCY MEDICINE

## 2024-09-01 PROCEDURE — 6360000002 HC RX W HCPCS: Performed by: INTERNAL MEDICINE

## 2024-09-01 PROCEDURE — 2580000003 HC RX 258: Performed by: EMERGENCY MEDICINE

## 2024-09-01 PROCEDURE — 96374 THER/PROPH/DIAG INJ IV PUSH: CPT

## 2024-09-01 PROCEDURE — 2580000003 HC RX 258: Performed by: INTERNAL MEDICINE

## 2024-09-01 PROCEDURE — 71045 X-RAY EXAM CHEST 1 VIEW: CPT

## 2024-09-01 PROCEDURE — 94640 AIRWAY INHALATION TREATMENT: CPT

## 2024-09-01 PROCEDURE — 80053 COMPREHEN METABOLIC PANEL: CPT

## 2024-09-01 PROCEDURE — 93306 TTE W/DOPPLER COMPLETE: CPT

## 2024-09-01 PROCEDURE — 2580000003 HC RX 258

## 2024-09-01 PROCEDURE — 85025 COMPLETE CBC W/AUTO DIFF WBC: CPT

## 2024-09-01 PROCEDURE — 93005 ELECTROCARDIOGRAM TRACING: CPT | Performed by: EMERGENCY MEDICINE

## 2024-09-01 PROCEDURE — 96375 TX/PRO/DX INJ NEW DRUG ADDON: CPT

## 2024-09-01 PROCEDURE — 83880 ASSAY OF NATRIURETIC PEPTIDE: CPT

## 2024-09-01 PROCEDURE — 93306 TTE W/DOPPLER COMPLETE: CPT | Performed by: INTERNAL MEDICINE

## 2024-09-01 PROCEDURE — 94660 CPAP INITIATION&MGMT: CPT

## 2024-09-01 PROCEDURE — 82803 BLOOD GASES ANY COMBINATION: CPT

## 2024-09-01 RX ORDER — AZELASTINE 1 MG/ML
2 SPRAY, METERED NASAL 2 TIMES DAILY
Status: DISCONTINUED | OUTPATIENT
Start: 2024-09-01 | End: 2024-09-01 | Stop reason: CLARIF

## 2024-09-01 RX ORDER — BUDESONIDE 0.5 MG/2ML
0.5 INHALANT ORAL
Status: DISCONTINUED | OUTPATIENT
Start: 2024-09-01 | End: 2024-09-03 | Stop reason: HOSPADM

## 2024-09-01 RX ORDER — MAGNESIUM SULFATE IN WATER 40 MG/ML
2000 INJECTION, SOLUTION INTRAVENOUS PRN
Status: DISCONTINUED | OUTPATIENT
Start: 2024-09-01 | End: 2024-09-03 | Stop reason: HOSPADM

## 2024-09-01 RX ORDER — ONDANSETRON 4 MG/1
4 TABLET, ORALLY DISINTEGRATING ORAL EVERY 8 HOURS PRN
Status: DISCONTINUED | OUTPATIENT
Start: 2024-09-01 | End: 2024-09-03 | Stop reason: HOSPADM

## 2024-09-01 RX ORDER — ONDANSETRON 2 MG/ML
4 INJECTION INTRAMUSCULAR; INTRAVENOUS EVERY 6 HOURS PRN
Status: DISCONTINUED | OUTPATIENT
Start: 2024-09-01 | End: 2024-09-03 | Stop reason: HOSPADM

## 2024-09-01 RX ORDER — POLYETHYLENE GLYCOL 3350 17 G/17G
17 POWDER, FOR SOLUTION ORAL DAILY PRN
Status: DISCONTINUED | OUTPATIENT
Start: 2024-09-01 | End: 2024-09-03 | Stop reason: HOSPADM

## 2024-09-01 RX ORDER — IPRATROPIUM BROMIDE AND ALBUTEROL SULFATE 2.5; .5 MG/3ML; MG/3ML
3 SOLUTION RESPIRATORY (INHALATION) ONCE
Status: COMPLETED | OUTPATIENT
Start: 2024-09-01 | End: 2024-09-01

## 2024-09-01 RX ORDER — IPRATROPIUM BROMIDE AND ALBUTEROL SULFATE 2.5; .5 MG/3ML; MG/3ML
1 SOLUTION RESPIRATORY (INHALATION)
Status: DISCONTINUED | OUTPATIENT
Start: 2024-09-01 | End: 2024-09-03 | Stop reason: HOSPADM

## 2024-09-01 RX ORDER — WARFARIN SODIUM 3 MG/1
3 TABLET ORAL DAILY
Status: DISCONTINUED | OUTPATIENT
Start: 2024-09-01 | End: 2024-09-03

## 2024-09-01 RX ORDER — POTASSIUM CHLORIDE 7.45 MG/ML
10 INJECTION INTRAVENOUS PRN
Status: DISCONTINUED | OUTPATIENT
Start: 2024-09-01 | End: 2024-09-03 | Stop reason: HOSPADM

## 2024-09-01 RX ORDER — POTASSIUM CHLORIDE 1500 MG/1
40 TABLET, EXTENDED RELEASE ORAL PRN
Status: DISCONTINUED | OUTPATIENT
Start: 2024-09-01 | End: 2024-09-03 | Stop reason: HOSPADM

## 2024-09-01 RX ORDER — M-VIT,TX,IRON,MINS/CALC/FOLIC 27MG-0.4MG
1 TABLET ORAL DAILY
Status: DISCONTINUED | OUTPATIENT
Start: 2024-09-01 | End: 2024-09-03 | Stop reason: HOSPADM

## 2024-09-01 RX ORDER — METOPROLOL TARTRATE 25 MG/1
12.5 TABLET, FILM COATED ORAL 2 TIMES DAILY
Status: DISCONTINUED | OUTPATIENT
Start: 2024-09-01 | End: 2024-09-01 | Stop reason: SDUPTHER

## 2024-09-01 RX ORDER — ACETAMINOPHEN 650 MG/1
650 SUPPOSITORY RECTAL EVERY 6 HOURS PRN
Status: DISCONTINUED | OUTPATIENT
Start: 2024-09-01 | End: 2024-09-03 | Stop reason: HOSPADM

## 2024-09-01 RX ORDER — FUROSEMIDE 10 MG/ML
40 INJECTION INTRAMUSCULAR; INTRAVENOUS 2 TIMES DAILY
Status: DISCONTINUED | OUTPATIENT
Start: 2024-09-01 | End: 2024-09-03

## 2024-09-01 RX ORDER — SODIUM CHLORIDE 0.9 % (FLUSH) 0.9 %
SYRINGE (ML) INJECTION
Status: COMPLETED
Start: 2024-09-01 | End: 2024-09-01

## 2024-09-01 RX ORDER — SODIUM CHLORIDE 0.9 % (FLUSH) 0.9 %
5-40 SYRINGE (ML) INJECTION PRN
Status: DISCONTINUED | OUTPATIENT
Start: 2024-09-01 | End: 2024-09-03 | Stop reason: HOSPADM

## 2024-09-01 RX ORDER — ACETAMINOPHEN 325 MG/1
650 TABLET ORAL EVERY 6 HOURS PRN
Status: DISCONTINUED | OUTPATIENT
Start: 2024-09-01 | End: 2024-09-03 | Stop reason: HOSPADM

## 2024-09-01 RX ORDER — METOPROLOL TARTRATE 25 MG/1
12.5 TABLET, FILM COATED ORAL 2 TIMES DAILY
Status: DISCONTINUED | OUTPATIENT
Start: 2024-09-01 | End: 2024-09-02

## 2024-09-01 RX ORDER — CETIRIZINE HYDROCHLORIDE 10 MG/1
10 TABLET ORAL DAILY
Status: DISCONTINUED | OUTPATIENT
Start: 2024-09-01 | End: 2024-09-03 | Stop reason: HOSPADM

## 2024-09-01 RX ORDER — SODIUM CHLORIDE 0.9 % (FLUSH) 0.9 %
5-40 SYRINGE (ML) INJECTION EVERY 12 HOURS SCHEDULED
Status: DISCONTINUED | OUTPATIENT
Start: 2024-09-01 | End: 2024-09-03 | Stop reason: HOSPADM

## 2024-09-01 RX ORDER — SODIUM CHLORIDE 9 MG/ML
INJECTION, SOLUTION INTRAVENOUS PRN
Status: DISCONTINUED | OUTPATIENT
Start: 2024-09-01 | End: 2024-09-03 | Stop reason: HOSPADM

## 2024-09-01 RX ORDER — CHLORTHALIDONE 25 MG/1
25 TABLET ORAL DAILY
Status: DISCONTINUED | OUTPATIENT
Start: 2024-09-01 | End: 2024-09-02

## 2024-09-01 RX ORDER — FUROSEMIDE 10 MG/ML
60 INJECTION INTRAMUSCULAR; INTRAVENOUS ONCE
Status: COMPLETED | OUTPATIENT
Start: 2024-09-01 | End: 2024-09-01

## 2024-09-01 RX ADMIN — IPRATROPIUM BROMIDE AND ALBUTEROL SULFATE 1 DOSE: 2.5; .5 SOLUTION RESPIRATORY (INHALATION) at 17:18

## 2024-09-01 RX ADMIN — BUDESONIDE 500 MCG: 0.5 SUSPENSION RESPIRATORY (INHALATION) at 17:21

## 2024-09-01 RX ADMIN — Medication 1 TABLET: at 16:06

## 2024-09-01 RX ADMIN — METOPROLOL TARTRATE 12.5 MG: 25 TABLET, FILM COATED ORAL at 13:57

## 2024-09-01 RX ADMIN — SODIUM CHLORIDE, PRESERVATIVE FREE 10 ML: 5 INJECTION INTRAVENOUS at 21:25

## 2024-09-01 RX ADMIN — IPRATROPIUM BROMIDE AND ALBUTEROL SULFATE 3 DOSE: .5; 3 SOLUTION RESPIRATORY (INHALATION) at 02:34

## 2024-09-01 RX ADMIN — WARFARIN SODIUM 3 MG: 3 TABLET ORAL at 21:24

## 2024-09-01 RX ADMIN — CETIRIZINE HYDROCHLORIDE 10 MG: 10 TABLET ORAL at 13:57

## 2024-09-01 RX ADMIN — SODIUM CHLORIDE, PRESERVATIVE FREE 10 ML: 5 INJECTION INTRAVENOUS at 14:08

## 2024-09-01 RX ADMIN — METOPROLOL TARTRATE 12.5 MG: 25 TABLET, FILM COATED ORAL at 21:23

## 2024-09-01 RX ADMIN — FUROSEMIDE 40 MG: 10 INJECTION, SOLUTION INTRAMUSCULAR; INTRAVENOUS at 13:57

## 2024-09-01 RX ADMIN — WATER 125 MG: 1 INJECTION INTRAMUSCULAR; INTRAVENOUS; SUBCUTANEOUS at 02:39

## 2024-09-01 RX ADMIN — CHLORTHALIDONE 25 MG: 25 TABLET ORAL at 16:06

## 2024-09-01 RX ADMIN — FUROSEMIDE 60 MG: 10 INJECTION, SOLUTION INTRAMUSCULAR; INTRAVENOUS at 04:10

## 2024-09-01 ASSESSMENT — LIFESTYLE VARIABLES
HOW OFTEN DO YOU HAVE A DRINK CONTAINING ALCOHOL: NEVER
HOW MANY STANDARD DRINKS CONTAINING ALCOHOL DO YOU HAVE ON A TYPICAL DAY: PATIENT DOES NOT DRINK

## 2024-09-01 ASSESSMENT — PAIN - FUNCTIONAL ASSESSMENT: PAIN_FUNCTIONAL_ASSESSMENT: NONE - DENIES PAIN

## 2024-09-01 NOTE — ED PROVIDER NOTES
obstructive pulmonary disease (COPD) (HCC)        DISPOSITION  Disposition: Admit to telemetry  Patient condition is stable    NOTE: This report was transcribed using voice recognition software. Every effort was made to ensure accuracy; however, inadvertent computerized transcription errors may be present    Mara Escalona MD  Attending Emergency Physician         Mara Escalona MD  09/01/24 0545

## 2024-09-01 NOTE — PLAN OF CARE
Patient is alert and oriented with goal to return to home on room air as before.   Patient has 4L currently, and will be following care based on goals

## 2024-09-01 NOTE — PLAN OF CARE
Problem: Discharge Planning  Goal: Discharge to home or other facility with appropriate resources  Outcome: Progressing  Flowsheets (Taken 9/1/2024 1015)  Discharge to home or other facility with appropriate resources:   Identify barriers to discharge with patient and caregiver   Identify discharge learning needs (meds, wound care, etc)   Arrange for needed discharge resources and transportation as appropriate   Refer to discharge planning if patient needs post-hospital services based on physician order or complex needs related to functional status, cognitive ability or social support system     Problem: Safety - Adult  Goal: Free from fall injury  Outcome: Progressing     Problem: Chronic Conditions and Co-morbidities  Goal: Patient's chronic conditions and co-morbidity symptoms are monitored and maintained or improved  Outcome: Progressing  Flowsheets (Taken 9/1/2024 1015)  Care Plan - Patient's Chronic Conditions and Co-Morbidity Symptoms are Monitored and Maintained or Improved:   Monitor and assess patient's chronic conditions and comorbid symptoms for stability, deterioration, or improvement   Collaborate with multidisciplinary team to address chronic and comorbid conditions and prevent exacerbation or deterioration   Update acute care plan with appropriate goals if chronic or comorbid symptoms are exacerbated and prevent overall improvement and discharge

## 2024-09-01 NOTE — H&P
Department of Internal Medicine  History and Physical    PCP: Dr. Renaldo Sandhu   Admitting Physician: Dr. Renaldo Sandhu  Consultants: Pulmonology      CHIEF COMPLAINT:  shortness of breath    HISTORY OF PRESENT ILLNESS:    This is a 74 year old male patient with history of CHF, afib on Coumadin, hypertension, COPD, hyperlipidemia, and tobacco use.  He has history of recent cholecystectomy done 8/4.  He was brought in with complaints of worsening shortness of breath.  Laboratory studies today show elevated bun of 27 and creat of 1.4.  He has a proBNP of 3,740.  The chest xray today shows cardiomegaly with airspace disease and small bilateral pleural effusions suggestive of CHF.  The EKG shows atrial fibrillation.  ABG shows acute hypoxic and hypercapnic respiratory acidosis.  He is tachycardic. At this time he is being admitted for evaluation and treatment.     PAST MEDICAL Hx:  Past Medical History:   Diagnosis Date    Atrial fibrillation (HCC)     CHF (congestive heart failure) (HCC) Echo 2/15/14     EF 45% prob diastolic    History of cardiovascular stress test 2/17/14    lexiscan    Hyperlipidemia     Hypertension     Obstructive chronic bronchitis with exacerbation (HCC) 2/18/2014    Pneumonia     Tobacco dependence 4/10/2014       PAST SURGICAL Hx:   Past Surgical History:   Procedure Laterality Date    ABDOMINAL AORTIC ANEURYSM REPAIR  2014    Dr. Gunedrson [facility]    CARDIAC CATHETERIZATION  4/17/2014    By Dr. Ashby    CHOLECYSTECTOMY, LAPAROSCOPIC N/A 8/4/2024    CHOLECYSTECTOMY LAPAROSCOPIC performed by Nav Crenshaw MD at [facility] OR    COLONOSCOPY  2021    INNER EAR SURGERY  1995    INNER EAR SURGERY Right 11/1/2021    EAR TUBE REMOVAL-RIGHT. CANCELLED AFTER INDUCTION performed by Ernestina Freeman MD at [facility] OR    PACEMAKER PLACEMENT      [employer]    PILONIDAL CYST EXCISION  1970's-80's    TYMPANOPLASTY Left 11/1/2021    LEFT TYMPANOPLASTY POSSIBLE TYMPANOPLASTY  MASTOIDECTOMY. CANCELLED AFTER INDUCTION

## 2024-09-02 ENCOUNTER — APPOINTMENT (OUTPATIENT)
Dept: GENERAL RADIOLOGY | Age: 74
DRG: 291 | End: 2024-09-02
Payer: MEDICARE

## 2024-09-02 LAB
ALBUMIN SERPL-MCNC: 3.9 G/DL (ref 3.5–5.2)
ALP SERPL-CCNC: 86 U/L (ref 40–129)
ALT SERPL-CCNC: 24 U/L (ref 0–40)
ANION GAP SERPL CALCULATED.3IONS-SCNC: 13 MMOL/L (ref 7–16)
AST SERPL-CCNC: 21 U/L (ref 0–39)
BASOPHILS # BLD: 0.02 K/UL (ref 0–0.2)
BASOPHILS NFR BLD: 0 % (ref 0–2)
BILIRUB SERPL-MCNC: 0.4 MG/DL (ref 0–1.2)
BUN SERPL-MCNC: 35 MG/DL (ref 6–23)
CALCIUM SERPL-MCNC: 9.1 MG/DL (ref 8.6–10.2)
CHLORIDE SERPL-SCNC: 100 MMOL/L (ref 98–107)
CO2 SERPL-SCNC: 27 MMOL/L (ref 22–29)
CREAT SERPL-MCNC: 1.5 MG/DL (ref 0.7–1.2)
EOSINOPHIL # BLD: 0.02 K/UL (ref 0.05–0.5)
EOSINOPHILS RELATIVE PERCENT: 0 % (ref 0–6)
ERYTHROCYTE [DISTWIDTH] IN BLOOD BY AUTOMATED COUNT: 14.8 % (ref 11.5–15)
GFR, ESTIMATED: 49 ML/MIN/1.73M2
GLUCOSE SERPL-MCNC: 111 MG/DL (ref 74–99)
HCT VFR BLD AUTO: 37.8 % (ref 37–54)
HGB BLD-MCNC: 12.7 G/DL (ref 12.5–16.5)
IMM GRANULOCYTES # BLD AUTO: 0.08 K/UL (ref 0–0.58)
IMM GRANULOCYTES NFR BLD: 1 % (ref 0–5)
LYMPHOCYTES NFR BLD: 1.71 K/UL (ref 1.5–4)
LYMPHOCYTES RELATIVE PERCENT: 11 % (ref 20–42)
MCH RBC QN AUTO: 33.3 PG (ref 26–35)
MCHC RBC AUTO-ENTMCNC: 33.6 G/DL (ref 32–34.5)
MCV RBC AUTO: 99.2 FL (ref 80–99.9)
MONOCYTES NFR BLD: 0.99 K/UL (ref 0.1–0.95)
MONOCYTES NFR BLD: 7 % (ref 2–12)
NEUTROPHILS NFR BLD: 82 % (ref 43–80)
NEUTS SEG NFR BLD: 12.46 K/UL (ref 1.8–7.3)
PLATELET # BLD AUTO: 195 K/UL (ref 130–450)
PMV BLD AUTO: 9.9 FL (ref 7–12)
POTASSIUM SERPL-SCNC: 3.8 MMOL/L (ref 3.5–5)
PROCALCITONIN SERPL-MCNC: 0.06 NG/ML (ref 0–0.08)
PROT SERPL-MCNC: 6.8 G/DL (ref 6.4–8.3)
RBC # BLD AUTO: 3.81 M/UL (ref 3.8–5.8)
SODIUM SERPL-SCNC: 140 MMOL/L (ref 132–146)
WBC OTHER # BLD: 15.3 K/UL (ref 4.5–11.5)

## 2024-09-02 PROCEDURE — 80053 COMPREHEN METABOLIC PANEL: CPT

## 2024-09-02 PROCEDURE — 6370000000 HC RX 637 (ALT 250 FOR IP): Performed by: INTERNAL MEDICINE

## 2024-09-02 PROCEDURE — 71045 X-RAY EXAM CHEST 1 VIEW: CPT

## 2024-09-02 PROCEDURE — 99233 SBSQ HOSP IP/OBS HIGH 50: CPT | Performed by: INTERNAL MEDICINE

## 2024-09-02 PROCEDURE — 6360000002 HC RX W HCPCS: Performed by: INTERNAL MEDICINE

## 2024-09-02 PROCEDURE — 36415 COLL VENOUS BLD VENIPUNCTURE: CPT

## 2024-09-02 PROCEDURE — 2580000003 HC RX 258: Performed by: INTERNAL MEDICINE

## 2024-09-02 PROCEDURE — 84145 PROCALCITONIN (PCT): CPT

## 2024-09-02 PROCEDURE — 85025 COMPLETE CBC W/AUTO DIFF WBC: CPT

## 2024-09-02 PROCEDURE — 2700000000 HC OXYGEN THERAPY PER DAY

## 2024-09-02 PROCEDURE — 2060000000 HC ICU INTERMEDIATE R&B

## 2024-09-02 PROCEDURE — 94640 AIRWAY INHALATION TREATMENT: CPT

## 2024-09-02 RX ORDER — METOPROLOL TARTRATE 25 MG/1
25 TABLET, FILM COATED ORAL 2 TIMES DAILY
Status: DISCONTINUED | OUTPATIENT
Start: 2024-09-02 | End: 2024-09-03 | Stop reason: HOSPADM

## 2024-09-02 RX ADMIN — Medication 1 TABLET: at 10:11

## 2024-09-02 RX ADMIN — SODIUM CHLORIDE, PRESERVATIVE FREE 10 ML: 5 INJECTION INTRAVENOUS at 22:03

## 2024-09-02 RX ADMIN — METOPROLOL TARTRATE 25 MG: 25 TABLET, FILM COATED ORAL at 10:11

## 2024-09-02 RX ADMIN — BUDESONIDE 500 MCG: 0.5 SUSPENSION RESPIRATORY (INHALATION) at 17:50

## 2024-09-02 RX ADMIN — WARFARIN SODIUM 3 MG: 3 TABLET ORAL at 18:46

## 2024-09-02 RX ADMIN — FUROSEMIDE 40 MG: 10 INJECTION, SOLUTION INTRAMUSCULAR; INTRAVENOUS at 10:12

## 2024-09-02 RX ADMIN — IPRATROPIUM BROMIDE AND ALBUTEROL SULFATE 1 DOSE: 2.5; .5 SOLUTION RESPIRATORY (INHALATION) at 05:21

## 2024-09-02 RX ADMIN — FUROSEMIDE 40 MG: 10 INJECTION, SOLUTION INTRAMUSCULAR; INTRAVENOUS at 18:46

## 2024-09-02 RX ADMIN — CETIRIZINE HYDROCHLORIDE 10 MG: 10 TABLET ORAL at 10:11

## 2024-09-02 RX ADMIN — IPRATROPIUM BROMIDE AND ALBUTEROL SULFATE 1 DOSE: 2.5; .5 SOLUTION RESPIRATORY (INHALATION) at 14:14

## 2024-09-02 RX ADMIN — IPRATROPIUM BROMIDE AND ALBUTEROL SULFATE 1 DOSE: 2.5; .5 SOLUTION RESPIRATORY (INHALATION) at 10:16

## 2024-09-02 RX ADMIN — METOPROLOL TARTRATE 25 MG: 25 TABLET, FILM COATED ORAL at 22:03

## 2024-09-02 RX ADMIN — BUDESONIDE 500 MCG: 0.5 SUSPENSION RESPIRATORY (INHALATION) at 05:21

## 2024-09-02 RX ADMIN — SODIUM CHLORIDE, PRESERVATIVE FREE 10 ML: 5 INJECTION INTRAVENOUS at 10:12

## 2024-09-02 RX ADMIN — IPRATROPIUM BROMIDE AND ALBUTEROL SULFATE 1 DOSE: 2.5; .5 SOLUTION RESPIRATORY (INHALATION) at 17:50

## 2024-09-02 NOTE — PLAN OF CARE
Problem: Discharge Planning  Goal: Discharge to home or other facility with appropriate resources  9/1/2024 2206 by Stefanie Corcoran RN  Outcome: Progressing  9/1/2024 1320 by Angela Ledesma RN  Outcome: Progressing  Flowsheets (Taken 9/1/2024 1015)  Discharge to home or other facility with appropriate resources:   Identify barriers to discharge with patient and caregiver   Identify discharge learning needs (meds, wound care, etc)   Arrange for needed discharge resources and transportation as appropriate   Refer to discharge planning if patient needs post-hospital services based on physician order or complex needs related to functional status, cognitive ability or social support system     Problem: Safety - Adult  Goal: Free from fall injury  9/1/2024 2206 by Stefanie Corcoran RN  Outcome: Progressing  9/1/2024 1320 by Angela Ledesma RN  Outcome: Progressing     Problem: Chronic Conditions and Co-morbidities  Goal: Patient's chronic conditions and co-morbidity symptoms are monitored and maintained or improved  9/1/2024 2206 by Stefanie Corcoran RN  Outcome: Progressing  9/1/2024 1320 by Angela Ledesma RN  Outcome: Progressing  Flowsheets (Taken 9/1/2024 1015)  Care Plan - Patient's Chronic Conditions and Co-Morbidity Symptoms are Monitored and Maintained or Improved:   Monitor and assess patient's chronic conditions and comorbid symptoms for stability, deterioration, or improvement   Collaborate with multidisciplinary team to address chronic and comorbid conditions and prevent exacerbation or deterioration   Update acute care plan with appropriate goals if chronic or comorbid symptoms are exacerbated and prevent overall improvement and discharge

## 2024-09-03 VITALS
BODY MASS INDEX: 28.03 KG/M2 | WEIGHT: 195.8 LBS | TEMPERATURE: 98 F | DIASTOLIC BLOOD PRESSURE: 66 MMHG | HEIGHT: 70 IN | HEART RATE: 88 BPM | RESPIRATION RATE: 18 BRPM | SYSTOLIC BLOOD PRESSURE: 135 MMHG | OXYGEN SATURATION: 94 %

## 2024-09-03 LAB
ALBUMIN SERPL-MCNC: 3.7 G/DL (ref 3.5–5.2)
ALP SERPL-CCNC: 82 U/L (ref 40–129)
ALT SERPL-CCNC: 27 U/L (ref 0–40)
ANION GAP SERPL CALCULATED.3IONS-SCNC: 12 MMOL/L (ref 7–16)
AST SERPL-CCNC: 24 U/L (ref 0–39)
BILIRUB SERPL-MCNC: 0.3 MG/DL (ref 0–1.2)
BUN SERPL-MCNC: 37 MG/DL (ref 6–23)
CALCIUM SERPL-MCNC: 9 MG/DL (ref 8.6–10.2)
CHLORIDE SERPL-SCNC: 97 MMOL/L (ref 98–107)
CO2 SERPL-SCNC: 30 MMOL/L (ref 22–29)
CREAT SERPL-MCNC: 1.6 MG/DL (ref 0.7–1.2)
ERYTHROCYTE [DISTWIDTH] IN BLOOD BY AUTOMATED COUNT: 14.7 % (ref 11.5–15)
GFR, ESTIMATED: 46 ML/MIN/1.73M2
GLUCOSE SERPL-MCNC: 93 MG/DL (ref 74–99)
HCT VFR BLD AUTO: 37.9 % (ref 37–54)
HGB BLD-MCNC: 12.6 G/DL (ref 12.5–16.5)
INR PPP: 3.9
MCH RBC QN AUTO: 32.8 PG (ref 26–35)
MCHC RBC AUTO-ENTMCNC: 33.2 G/DL (ref 32–34.5)
MCV RBC AUTO: 98.7 FL (ref 80–99.9)
PLATELET # BLD AUTO: 171 K/UL (ref 130–450)
PMV BLD AUTO: 9.7 FL (ref 7–12)
POTASSIUM SERPL-SCNC: 3.5 MMOL/L (ref 3.5–5)
PROT SERPL-MCNC: 6.6 G/DL (ref 6.4–8.3)
PROTHROMBIN TIME: 42.2 SEC (ref 9.3–12.4)
RBC # BLD AUTO: 3.84 M/UL (ref 3.8–5.8)
SODIUM SERPL-SCNC: 139 MMOL/L (ref 132–146)
WBC OTHER # BLD: 9.8 K/UL (ref 4.5–11.5)

## 2024-09-03 PROCEDURE — 85610 PROTHROMBIN TIME: CPT

## 2024-09-03 PROCEDURE — 85027 COMPLETE CBC AUTOMATED: CPT

## 2024-09-03 PROCEDURE — 80053 COMPREHEN METABOLIC PANEL: CPT

## 2024-09-03 PROCEDURE — 6370000000 HC RX 637 (ALT 250 FOR IP): Performed by: INTERNAL MEDICINE

## 2024-09-03 PROCEDURE — 97161 PT EVAL LOW COMPLEX 20 MIN: CPT | Performed by: PHYSICAL THERAPIST

## 2024-09-03 PROCEDURE — 99232 SBSQ HOSP IP/OBS MODERATE 35: CPT | Performed by: INTERNAL MEDICINE

## 2024-09-03 PROCEDURE — 2580000003 HC RX 258: Performed by: INTERNAL MEDICINE

## 2024-09-03 PROCEDURE — 97530 THERAPEUTIC ACTIVITIES: CPT | Performed by: PHYSICAL THERAPIST

## 2024-09-03 PROCEDURE — 2700000000 HC OXYGEN THERAPY PER DAY

## 2024-09-03 PROCEDURE — 6360000002 HC RX W HCPCS: Performed by: INTERNAL MEDICINE

## 2024-09-03 PROCEDURE — 94640 AIRWAY INHALATION TREATMENT: CPT

## 2024-09-03 RX ORDER — FUROSEMIDE 40 MG
40 TABLET ORAL DAILY
Qty: 60 TABLET | Refills: 3 | Status: SHIPPED | OUTPATIENT
Start: 2024-09-04

## 2024-09-03 RX ORDER — METOPROLOL TARTRATE 25 MG/1
25 TABLET, FILM COATED ORAL 2 TIMES DAILY
Qty: 60 TABLET | Refills: 3 | Status: SHIPPED | OUTPATIENT
Start: 2024-09-03

## 2024-09-03 RX ORDER — IPRATROPIUM BROMIDE AND ALBUTEROL SULFATE 2.5; .5 MG/3ML; MG/3ML
3 SOLUTION RESPIRATORY (INHALATION)
Qty: 360 ML | Refills: 0 | Status: SHIPPED | OUTPATIENT
Start: 2024-09-03

## 2024-09-03 RX ORDER — BUDESONIDE 0.5 MG/2ML
0.5 INHALANT ORAL
Qty: 60 EACH | Refills: 3 | Status: SHIPPED | OUTPATIENT
Start: 2024-09-03

## 2024-09-03 RX ORDER — POTASSIUM CHLORIDE 750 MG/1
10 TABLET, EXTENDED RELEASE ORAL DAILY
Qty: 90 TABLET | Refills: 1 | Status: SHIPPED | OUTPATIENT
Start: 2024-09-03

## 2024-09-03 RX ORDER — WARFARIN SODIUM 3 MG/1
3 TABLET ORAL DAILY
Status: DISCONTINUED | OUTPATIENT
Start: 2024-09-04 | End: 2024-09-03 | Stop reason: HOSPADM

## 2024-09-03 RX ORDER — FUROSEMIDE 40 MG
40 TABLET ORAL DAILY
Status: DISCONTINUED | OUTPATIENT
Start: 2024-09-03 | End: 2024-09-03 | Stop reason: HOSPADM

## 2024-09-03 RX ADMIN — FUROSEMIDE 40 MG: 40 TABLET ORAL at 08:43

## 2024-09-03 RX ADMIN — Medication 1 TABLET: at 08:43

## 2024-09-03 RX ADMIN — BUDESONIDE 500 MCG: 0.5 SUSPENSION RESPIRATORY (INHALATION) at 04:46

## 2024-09-03 RX ADMIN — IPRATROPIUM BROMIDE AND ALBUTEROL SULFATE 1 DOSE: 2.5; .5 SOLUTION RESPIRATORY (INHALATION) at 04:46

## 2024-09-03 RX ADMIN — IPRATROPIUM BROMIDE AND ALBUTEROL SULFATE 1 DOSE: 2.5; .5 SOLUTION RESPIRATORY (INHALATION) at 10:34

## 2024-09-03 RX ADMIN — CETIRIZINE HYDROCHLORIDE 10 MG: 10 TABLET ORAL at 08:43

## 2024-09-03 RX ADMIN — SODIUM CHLORIDE, PRESERVATIVE FREE 10 ML: 5 INJECTION INTRAVENOUS at 08:44

## 2024-09-03 RX ADMIN — METOPROLOL TARTRATE 25 MG: 25 TABLET, FILM COATED ORAL at 08:43

## 2024-09-03 NOTE — CONSULTS
[facility]   Inpatient CHF Nurse Navigator Consult      Cardiologist: Dr. Carlin (inpt)     Ronnie Molina is a 74 y.o. (1950) male with a history of HFpEF, most recent EF:  Lab Results   Component Value Date    LVEF 55 09/01/2024    LVEFMODE Echo 09/01/2024   50% ±5% 9/1/24    Patient was awake and alert, laying in bed during the consultation and is agreeable to heart failure education. He was engaged and asked appropriate questions throughout the education session.     Patient is agreeable to symptom monitoring, daily weights, and following a low sodium diet and follow up appointments with PCP, [facility] APRN and establishing with the CHF clinic.     Barriers identified during consult contributing to HF Hospitalization:  [x] Limited medication adherence   [x] Poor health literacy, education regarding HF medications provided   [] Pill box provided to patient  [] Difficulty affording medications  [] Difficulty obtaining/ managing medications  [] Prescription assistance information given     [x] Not weighing themselves daily  [x] Weight log provided for easy monitoring  [] Scale provided     [x] Not following low sodium diet  [] Food insecurity   [x] 2 gram sodium diet education provided   [] Low sodium recipes provided  [] Sodium free seasoning provided   [] Low sodium meal delivery options given to patient  [x] Dietician consulted     [] Lack of transportation to appointments     [] Depression, given chronic illness  [] Primary team notified     [] Goals of care need addressed  [] Palliative care consulted     [] CHF CHW consulted, to assist with n/a    Chart Reviewed:  Diet: ADULT DIET; Regular; Low Fat/Low Chol/High Fiber/2 gm Na   Daily Weights: Patient Vitals for the past 96 hrs (Last 3 readings):   Weight   09/03/24 0600 88.8 kg (195 lb 12.8 oz)   09/02/24 0600 93 kg (205 lb)   09/01/24 1129 93 kg (205 lb)     I/O: No intake or output data in the 24 hours 
Renaldo Sandhu MD    polyethylene glycol (GLYCOLAX) packet 17 g, 17 g, Oral, Daily PRN, Renaldo Sandhu MD    acetaminophen (TYLENOL) tablet 650 mg, 650 mg, Oral, Q6H PRN **OR** acetaminophen (TYLENOL) suppository 650 mg, 650 mg, Rectal, Q6H PRN, Renaldo Sandhu MD      Review of Systems:   General: denies weight gain, denies loss of appetite, fever, chills, night sweats.  HEENT: denies headaches, dizziness, head trauma, visual changes, eye pain, tinnitus, nosebleeds, hoarseness or throat pain    Respiratory: denies chest pain, +ve dyspnea, cough but no hemoptysis  Cardiovascular: denies orthopnea, paroxysmal nocturnal dyspnea, leg swelling, and previous heart attack.    Gastrointestinal: denies pain, nausea vomiting, diarrhea, constipation, melena or bleeding.  Genitourinary: denies hematuria, frequency, urgency or dysuria  Neurology: denies syncope, seizures, paralysis, paraesthesia   Endocrine: denies polyuria, polydipsia, skin or hair changes, and heat or cold intolerance  Musculoskeletal: denies joint pain, swelling, arthritis or myalgia  Hematologic: denies bleeding, adenopathy and easy bruising  Skin: denies rashes and skin discoloration  Psychiatry: denies depression    Physical Exam:   Vital Signs:  /68   Pulse 100   Temp 98.2 °F (36.8 °C) (Oral)   Resp 20   Ht 1.778 m (5' 10\")   Wt 93 kg (205 lb)   SpO2 95%   BMI 29.41 kg/m²     Input/Output:  In: 480 [P.O.:480]  Out: 925     Oxygen requirements: NC      General appearance: ill looking, not in pain but in mild  respiratory distress    HEENT: Atraumatic/normocephalic, EOMI, FARA, pharynx clear, moist mucosa, redness of the uvula appreciated,   Neck: Supple, no jugular venous distension, lymphadenopathy, thyromegaly or carotid Decreased breath sounds, no wheezing, +ve crackles and no tenderness over ribs   Cardiovascular: Normal S1 , S2, regular rate and rhythm, no murmur, rub or gallop  Abdomen: Normal sounds present, soft, lax with no 
Implanted 4/2017  DDDR LRL 50 bpm  9% atrial pacing  10% RV pacing  39% AT/AF burden  Episodes of atrial fibrillation and atrial flutter with high ventricular rates      -------------------------------------------------------------------------------------------------------------------------------------------------------------  IMPRESSION:  Acute on chronic heart failure with preserved ejection fraction  Persistent AF/AFL 39% burden recent interrogation.  Currently AF controlled rates  Warfarin anticoagulation INR 2.3  History of PVI/AFL ablation 3/2019, redo PVI/atypical flutter ablation 11/2020.  Previously failed dofetilide  Sinus node dysfunction s/p permanent pacemaker 2017 [facility]   Mitral valve repair #30 [facility] valve ring/biatrial maze/DEVONTE excision 2014  Mild and small vessel CAD by cath 2014  Acute hypoxic respiratory failure  CKD creatinine 1.4  Recent gangrenous cholecystitis s/p laparoscopic cholecystectomy 8/4/2024  Hypertension  Hyperlipidemia    RECOMMENDATIONS:  Decompensation likely related to hypervolemia after recent surgery and discharged without diuretic.    Continue IV diuresis with strict I's and O's  Continue metoprolol tartrate 12.5 mg twice daily for rate control  Check echocardiogram to assess EF and valvular function  Further optimize cardiac medications thereafter  On warfarin; consider transition to DOAC  Wean NIV as able  Antibiotic prophylaxis before dental and high risk procedure  Aggressive risk factor modification  Further care per primary service and consultants    Discussed with patient and significant other    Thank you for allowing me to participate in your patient's care. Please feel free to contact me if you have any questions or concerns.    Glen Ledesma MD, [facility]    NOTE: This report was transcribed using voice recognition software. Every effort was made to ensure accuracy; however, inadvertent computerized transcription errors may be present.

## 2024-09-03 NOTE — ACP (ADVANCE CARE PLANNING)
Advance Care Planning   Healthcare Decision Maker:    Primary Decision Maker: Gretel Molina - [facility] - [phone]      Today we documented Decision Maker(s) consistent with Legal Next of Kin hierarchy.

## 2024-09-03 NOTE — DISCHARGE INSTRUCTIONS
may need to decrease or hold the diuretic dose. On days you feels nauseated and not eating / drinking, having emesis or diarrhea,  informed to call the cardiologist  / doctor, they may need to decrease or hold diuretic to avoid dehydration.  Again, stressed the importance of informing their medical provider the first day of onset of any of the signs and symptoms in the \"Yellow Zone\" of the HF Zones.     The Heart Failure Booklet given to the patient with additional patient education addressing:  What is Heart Failure?  Things You Can Do to Live Well with HF  How to Take Your Medications  How to Eat Less Salt  [place] its Salt?  Exercising Well with Heart Failure  Signs and symptoms of HF to report  Weight Yourself Each Day  Home Self Management- activity, weight tracking, taking medications as prescribed, meals /diet planning (sodium and fluid restriction), how to read food labels, keeping physician follow ups, smoking cessation, follow the “Heart Failure Zones”  The Heart Failure zones  Every Dose Every Day    Instructed to call 911 if you have any of the following symptoms:  Struggling to breathe unrelieved with rest  Having chest pain  Confusion or can’t think clearly      Patient verbalizes understanding of above.   Greater than 30 minutes was spent educating patient.        Alisha Oglesby, RN   Heart Failure Navigator

## 2024-09-03 NOTE — CARE COORDINATION
Case Management Assessment  Initial Evaluation    Date/Time of Evaluation: 9/3/2024 4:42 PM  Assessment Completed by: FLORESITA García    If patient is discharged prior to next notation, then this note serves as note for discharge by case management.    Patient Name: Ronnie Molina                   YOB: 1950  Diagnosis: Acute exacerbation of chronic obstructive pulmonary disease (COPD) (HCC) [J44.1]  Acute decompensated heart failure (HCC) [I50.9]  Acute respiratory failure with hypoxia and hypercapnia (HCC) [J96.01, J96.02]  Acute on chronic respiratory failure with hypoxia and hypercapnia (HCC) [J96.21, J96.22]  CHF (congestive heart failure), NYHA class I, acute on chronic, combined (HCC) [I50.43]                   Date / Time: 9/1/2024  2:02 AM    Patient Admission Status: Inpatient   Readmission Risk (Low < 19, Mod (19-27), High > 27): Readmission Risk Score: 15.5    Current PCP: Renaldo Sandhu MD  PCP verified by CM? Yes    Chart Reviewed: Yes      History Provided by: Patient, Spouse  Patient Orientation: Alert and Oriented    Patient Cognition: Alert    Hospitalization in the last 30 days (Readmission):  Yes    Readmission Assessment  Number of Days since last admission?: 8-30 days  Previous Disposition: Home with Family  Who is being Interviewed: Patient  What was the patient's/caregiver's perception as to why they think they needed to return back to the hospital?: Other (Comment) (SOB)  Did you visit your Primary Care Physician after you left the hospital, before you returned this time?: Yes  Did you see a specialist, such as Cardiac, Pulmonary, Orthopedic Physician, etc. after you left the hospital?: No  Who advised the patient to return to the hospital?: Self-referral  Does the patient report anything that got in the way of taking their medications?: No  In our efforts to provide the best possible care to you and others like you, can you think of anything that we could have done

## 2024-09-04 NOTE — PROGRESS NOTES
INPATIENT CARDIOLOGY FOLLOW-UP    Name: Ronnie Molina    Age: 74 y.o.    Date of Admission: 9/1/2024  2:02 AM    Date of Service: 9/2/2024    Primary Cardiologist: Known to [facility] through recent inpatient evaluation 8/2024 by Dr. Carlin; Dr Aleyda NEAL @ [facility]     Chief Complaint: Follow-up for heart failure    Interim History:  Feels much better.  Breathing improved.  Denies chest pain.    I's and O's appear inaccurate.  Weaned down to 3 L.    Review of Systems:   Negative except as described above    Problem List:  Patient Active Problem List   Diagnosis    Pulmonary congestion and hypostasis    Atrial flutter (HCC)    Essential hypertension, benign    Obstructive chronic bronchitis with exacerbation (HCC)    Cardiomyopathy (HCC)    Mitral valve disease    Hypertensive cardiomyopathy (HCC)    AAA (abdominal aortic aneurysm) (HCC)    Tobacco dependence    Cardiomyopathy, nonischemic    Cholesteatoma of attic of ear, left    Sensorineural hearing loss (SNHL), bilateral    Retained myringotomy tube in right ear    Irregular cardiac rhythm    Preop cardiovascular exam    Tobacco use    H/O mitral valve replacement    Stage 3a chronic kidney disease (HCC)    Paroxysmal atrial fibrillation (HCC)    Chronic obstructive pulmonary disease (HCC)    Acute cholecystitis    S/P MVR (mitral valve repair)    Permanent atrial fibrillation (HCC)    Obesity (BMI 30.0-34.9)    Acute decompensated heart failure (HCC)    Acute on chronic respiratory failure with hypoxia and hypercapnia (HCC)    CHF (congestive heart failure), NYHA class I, acute on chronic, combined (HCC)       Current Medications:    Current Facility-Administered Medications:     furosemide (LASIX) injection 40 mg, 40 mg, IntraVENous, BID, Oracio Ledesma MD, 40 mg at 09/01/24 1357    metoprolol tartrate (LOPRESSOR) tablet 12.5 mg, 12.5 mg, Oral, BID, Oracio Ledesma MD, 12.5 mg at 09/01/24 3853    cetirizine (ZYRTEC) tablet 10 mg, 10 mg, Oral, Daily, Edson 
    INPATIENT CARDIOLOGY FOLLOW-UP    Name: Ronnie Molina    Age: 74 y.o.    Date of Admission: 9/1/2024  2:02 AM    Date of Service: 9/3/2024    Primary Cardiologist: Known to [facility] through recent inpatient evaluation 8/2024 by Dr. Carlin; Dr Aleyda NEAL @ [facility]     Chief Complaint: Follow-up for heart failure    Interim History:  Feels much better.  Breathing improved.  Denies chest pain.    I's and O's appear inaccurate.  Still on 3 L.    Review of Systems:   Negative except as described above    Problem List:  Patient Active Problem List   Diagnosis    Pulmonary congestion and hypostasis    Atrial flutter (HCC)    Essential hypertension, benign    Obstructive chronic bronchitis with exacerbation (HCC)    Cardiomyopathy (HCC)    Mitral valve disease    Hypertensive cardiomyopathy (HCC)    AAA (abdominal aortic aneurysm) (HCC)    Tobacco dependence    Cardiomyopathy, nonischemic    Cholesteatoma of attic of ear, left    Sensorineural hearing loss (SNHL), bilateral    Retained myringotomy tube in right ear    Irregular cardiac rhythm    Preop cardiovascular exam    Tobacco use    H/O mitral valve replacement    Stage 3a chronic kidney disease (HCC)    Paroxysmal atrial fibrillation (HCC)    Chronic obstructive pulmonary disease (HCC)    Acute cholecystitis    S/P MVR (mitral valve repair)    Permanent atrial fibrillation (HCC)    Obesity (BMI 30.0-34.9)    Acute decompensated heart failure (HCC)    Acute on chronic respiratory failure with hypoxia and hypercapnia (HCC)    CHF (congestive heart failure), NYHA class I, acute on chronic, combined (HCC)       Current Medications:    Current Facility-Administered Medications:     furosemide (LASIX) tablet 40 mg, 40 mg, Oral, Daily, Oracio Ledesma MD, 40 mg at 09/03/24 0843    [START ON 9/4/2024] warfarin (COUMADIN) tablet 3 mg, 3 mg, Oral, Daily, Renaldo Sandhu MD    metoprolol tartrate (LOPRESSOR) tablet 25 mg, 25 mg, Oral, BID, Oracio Ledesma MD, 25 mg at 
  Primary Care Physician: Renaldo Sandhu MD   Admitting Physician:  Renaldo Sandhu MD  Admission date and time: 9/1/2024  2:02 AM    Room:  [address] [place] [address]  Admitting diagnosis: Acute exacerbation of chronic obstructive pulmonary disease (COPD) (HCC) [J44.1]  Acute decompensated heart failure (HCC) [I50.9]  Acute respiratory failure with hypoxia and hypercapnia (HCC) [J96.01, J96.02]  Acute on chronic respiratory failure with hypoxia and hypercapnia (HCC) [J96.21, J96.22]  CHF (congestive heart failure), NYHA class I, acute on chronic, combined (HCC) [I50.43]    Patient Name: Ronnie Molina  MRN: [id number]    Date of Service: 9/2/2024     Subjective:  Ronnie is a 74 y.o. male who was seen and examined today,9/2/2024, at the bedside.  Patient feeling much better breathing much better no chest pain no shortness of breath no dyspnea on exertion no fever chills no cough    No family present during my examination.    Review of systems 1 through 14 reviewed positive for minimal shortness of breath much improved      Physical Exam:  I/O this shift:  In: 600 [P.O.:600]  Out: -     Intake/Output Summary (Last 24 hours) at 9/2/2024 1737  Last data filed at 9/2/2024 1259  Gross per 24 hour   Intake 850 ml   Output --   Net 850 ml   I/O last 3 completed shifts:  In: 730 [P.O.:720; I.V.:10]  Out: 925 [Urine:925]  Patient Vitals for the past 96 hrs (Last 3 readings):   Weight   09/02/24 0600 93 kg (205 lb)   09/01/24 1129 93 kg (205 lb)   09/01/24 0204 93 kg (205 lb)     Vital Signs:   Blood pressure 127/82, pulse 72, temperature 97.7 °F (36.5 °C), temperature source Oral, resp. rate 18, height 1.778 m (5' 10\"), weight 93 kg (205 lb), SpO2 97%.    Patient is awake alert oriented hemodynamically stable  HEENT unremarkable  Neck +JVD negative node negative thyromegaly  Lungs bibasilar rales  Cardiac S1-S2 normal irregularly irregular left chest wall pacemaker  Abdomen soft nontender plus bowel sounds  Extremities moves all extremities 
  [facility]  Department of Internal Medicine  Division of Pulmonary, Critical Care and Sleep Medicine  Progress Note      Assessment:     Acute hypoxic respiratory failure    Acute on chronic heart failure with midrange ejection fraction  Acute pulmonary edema  COPD without acute exacerbation  Persistent atrial fibrillation  Pacemaker in situ  S/P mitral valve repair in 2014  Coronary artery disease  Former nicotine abuse  COCO untreated           Plan:     Supplemental oxygen for sats 88-94%  Check for home oxygen requirements prior to discharge  Scheduled budesonide and duonebs  Continue to diurese as able with lasix  No plans for thoracentesis at this time as effusions are small   Monitor I&Os  DVT prophylaxis with warfarin         Subjective:  Patient seen and examined.  He was seen on 3L nasal cannula.  Reports feeling better.  Denies dyspnea, cough or wheezing.        OBJECTIVE:     PHYSICAL EXAM:   VITALS:   Vitals:    09/02/24 2331 09/03/24 0533 09/03/24 0600 09/03/24 0830   BP: (!) 114/56 129/69  135/66   Pulse: 68 89  88   Resp: 16 18  18   Temp: 98.1 °F (36.7 °C) 97.6 °F (36.4 °C)  98 °F (36.7 °C)   TempSrc: Oral Oral  Oral   SpO2: 94% 95%  94%   Weight:   88.8 kg (195 lb 12.8 oz)    Height:            Intake/Output Summary (Last 24 hours) at 9/3/2024 1526  Last data filed at 9/3/2024 1503  Gross per 24 hour   Intake 360 ml   Output --   Net 360 ml        CONSTITUTIONAL:   A&O x 3, NAD  SKIN:     No rash, No suspicious lesions, No skin discoloration  HEENT:     EOMI, MMM, No thrush  NECK:    No bruits, No JVP appreciated  CV:      Sinus,  No murmur, No rubs, No gallops  PULMONARY:   Decreased BS,  No Wheezing, No Rales, No Rhonchi      No noted egophony  ABDOMEN:     Soft, non-tender. BS normal. No R/R/G  EXT:    No deformities .  No clubbing.       no lower extremity edema, No venous stasis  PULSE:   Appears equal and palpable.  PSYCHIATRIC:  Seems appropriate, No 
4 Eyes Skin Assessment     NAME:  Ronnie Molina  YOB: 1950  MEDICAL RECORD NUMBER:  [id number]    The patient is being assessed for  Admission    I agree that at least one RN has performed a thorough Head to Toe Skin Assessment on the patient. ALL assessment sites listed below have been assessed.      Areas assessed by both nurses:    Head, Face, Ears, Shoulders, Back, Chest, Arms, Elbows, Hands, Sacrum. Buttock, Coccyx, Ischium, Legs. Feet and Heels, and Under Medical Devices         Does the Patient have a Wound? Yes wound(s) were present on assessment. LDA wound assessment was Initiated and completed by RN       Luis M Prevention initiated by RN: Yes  Wound Care Orders initiated by RN: No    Pressure Injury (Stage 3,4, Unstageable, DTI, NWPT, and Complex wounds) if present, place Wound referral order by RN under : No    New Ostomies, if present place, Ostomy referral order under : No     Nurse 1 eSignature: Electronically signed by Angela Ledesma RN on 9/1/24 at 1:34 PM EDT    **SHARE this note so that the co-signing nurse can place an eSignature**    Nurse 2 eSignature: Electronically signed by Sara Rdz RN on 9/1/24 at 6:44 PM EDT   
Assessment and Plan  Patient is a 74 y.o. male with the following medical Problems:   Acute hypoxic respiratory failure requiring supplemental oxygen.  Acute pulmonary edema.  Acute on chronic heart failure with midrange ejection fraction.  COPD without acute exacerbation.  Persistent atrial fibrillation  Pacemaker in situ  S/P mitral valve repair in 2014  Coronary artery disease  Former nicotine abuse  CKD stage IV  Hypertension  Dyslipidemia    Plan of care:  Patient respiratory failure is most likely related to heart failure rather than to COPD exacerbation.  I would recommend diuresis with close monitoring of kidney function.  Steroid will be avoided at this stage.  Scheduled DuoNeb and Pulmicort.  Patient is on Coumadin with a therapeutic INR which covers for DVT prophylaxis.  Protonix for GI prophylaxis.  Chest x-ray today  Assess response to diuresis and evaluate the need for home oxygen before discharge.  Incentive spirometer.    History of Present Illness:   Patient is a 74-year-old man with above-mentioned medical problem who presented with progressive shortness of breath and cough.  Patient had a recent cholecystectomy for cholecystitis.  Chest x-ray was personally reviewed and independently interpreted and showed acute pulmonary edema with pacemaker in situ.  Patient has chronic heart failure and has been following up with [facility].    Daily progress:  September 2, 2024: Patient respiratory symptoms continue to improve.  He is currently on 3 L nasal cannula.  He has no fever, chills, rigors.  Chest x-ray was ordered today to evaluate the response to diuresis.  Creatinine has plateaued around 1.5.  Case was discussed with primary team.  Patient has been maintained on Lasix twice a day.  Diuresis need to be adjusted based on renal function.  Case was discussed with cardiology team as well.  INR is therapeutic.    September 3, 2024: Patient had an uneventful night.  He has no fever, chills, rigors.  
CLINICAL PHARMACY NOTE: MEDS TO BEDS    Total # of Prescriptions Filled: 3   The following medications were delivered to the patient:  Furosemide  40 mg  Metoprolol tartrate 25 mg  Potassium chloride 10    Additional Documentation:  Duoneb and pulmicort sent to [facility] [address].  
Occupational Therapy    Room #:  0616/0616-01  Patient Name: Ronnie Molina  YOB: 1950  MRN: [id number]      Date of Service: 9/3/2024    Chart reviewed. Spoke with nursing . Patient  reports to be independent with ADLs and functional mobility.  No skilled Occupational Therapy needs at this time. Please re-order Occupational Therapy if there is a change in physical status and Occupational Therapy is needed. Thank you.     Carroll Krishnan OTR/L; #[id number]      
Patient arrived to room 616-1 via cart. No s/s of acute distress or pain. Patient is alert and oriented x4 from home, where he is independent with ambulation and continues to drive. Educated on call light, staff, room and safety. Verbalizes 100% of understanding. Wife is at bedside. (See admission assessment)  
Pharmacy Note    This patient was ordered azelastine 0.1% nasal spray. Per the Pharmacy & Therapeutics Committee, this medication is non-formulary and not stocked by pharmacy for the reason indicated below. The medication can be reordered at discharge.     Medications in which risks outweigh benefits during hospitalization:           -  oral bisphosphonates         -  raloxifene (Evista)        -  SGLT2 inhibitors (ordered in the hospital for an indication other than heart failure or chronic kidney disease)    Medications that lack necessity during an acute hospital stay:        -  nasal antihistamines        -  nasal ipratropium 0.03% and 0.06%        -  nasal miacalcin        -  acyclovir topical cream/ointment orders for herpes labialis (cold sores)    
Physical Therapy    Physical Therapy Initial Evaluation/Plan of Care    Room #:  0616/0616-01  Patient Name: Ronnie Molina  YOB: 1950  MRN: [id number]    Date of Service: 9/3/2024     Tentative placement recommendation: Home with no Physical Therapy needs  Equipment recommendation: None      Evaluating Physical Therapist: Leon Wolf, PT  #[id number]      Specific Provider Orders/Date/Referring Provider :     PT evaluation and treat  Start:  09/01/24 1245,   End:  09/01/24 1245,   ONE TIME,   Standing Count:  1 Occurrences,   R       Renaldo Sandhu MD    Admitting Diagnosis:   Acute exacerbation of chronic obstructive pulmonary disease (COPD) ([facility]) [J44.1]  Acute decompensated heart failure (HCC) [I50.9]  Acute respiratory failure with hypoxia and hypercapnia (HCC) [J96.01, J96.02]  Acute on chronic respiratory failure with hypoxia and hypercapnia (HCC) [J96.21, J96.22]  CHF (congestive heart failure), NYHA class I, acute on chronic, combined (HCC) [I50.43]    Admitted with    shortness of breath ; recent admission for camille 8/4/24    Visit Diagnoses         Codes    Acute respiratory failure with hypoxia and hypercapnia (HCC)     J96.01, J96.02    Acute exacerbation of chronic obstructive pulmonary disease (COPD) (HCC)     J44.1    Acute on chronic combined systolic and diastolic congestive heart failure (HCC)     I50.43            Patient Active Problem List   Diagnosis    Pulmonary congestion and hypostasis    Atrial flutter (HCC)    Essential hypertension, benign    Obstructive chronic bronchitis with exacerbation (HCC)    Cardiomyopathy (HCC)    Mitral valve disease    Hypertensive cardiomyopathy (HCC)    AAA (abdominal aortic aneurysm) (HCC)    Tobacco dependence    Cardiomyopathy, nonischemic    Cholesteatoma of attic of ear, left    Sensorineural hearing loss (SNHL), bilateral    Retained myringotomy tube in right ear    Irregular cardiac rhythm    Preop cardiovascular exam    Tobacco use    
Pulse ox was 94% on room air at rest.  Ambulated patient on room air down hallway 200 feet.  Oxygen saturation range was 89-94 % on room air while ambulating.  Oxygen was not applied. Patient recovered from the 89% quickly.  Patient is resting on room air now.   
fibrillation (HCC)     CHF (congestive heart failure) (HCC) Echo 2/15/14     EF 45% prob diastolic    History of cardiovascular stress test 2/17/14    lexiscan    Hyperlipidemia     Hypertension     Obstructive chronic bronchitis with exacerbation (HCC) 2/18/2014    Pneumonia     Tobacco dependence 4/10/2014      Past Surgical History:   Procedure Laterality Date    ABDOMINAL AORTIC ANEURYSM REPAIR  2014    Dr. Gunderson [facility]    CARDIAC CATHETERIZATION  4/17/2014    By Dr. Ashby    CHOLECYSTECTOMY, LAPAROSCOPIC N/A 8/4/2024    CHOLECYSTECTOMY LAPAROSCOPIC performed by Nav Crenshaw MD at [facility] OR    COLONOSCOPY  2021    INNER EAR SURGERY  1995    INNER EAR SURGERY Right 11/1/2021    EAR TUBE REMOVAL-RIGHT. CANCELLED AFTER INDUCTION performed by Ernestina Freeman MD at [facility] OR    PACEMAKER PLACEMENT      [employer]    PILONIDAL CYST EXCISION  1970's-80's    TYMPANOPLASTY Left 11/1/2021    LEFT TYMPANOPLASTY POSSIBLE TYMPANOPLASTY  MASTOIDECTOMY. CANCELLED AFTER INDUCTION performed by Ernestina Freeman MD at [facility] OR       Family History:   History reviewed. No pertinent family history.    Allergies:         Carvedilol and Augmentin [amoxicillin-pot clavulanate]    Social history:  Social History     Socioeconomic History    Marital status:      Spouse name: Not on file    Number of children: Not on file    Years of education: Not on file    Highest education level: Not on file   Occupational History    Not on file   Tobacco Use    Smoking status: Former     Current packs/day: 0.10     Average packs/day: 0.1 packs/day for 40.0 years (4.0 ttl pk-yrs)     Types: Cigarettes    Smokeless tobacco: Never    Tobacco comments:     occassionally a cigarette   Vaping Use    Vaping status: Never Used   Substance and Sexual Activity    Alcohol use: Yes     Alcohol/week: 2.0 standard drinks of alcohol     Types: 2 Cans of beer per week     Comment: maybe 2-3 beers a year    Drug use: No    Sexual activity: Never   Other

## 2024-09-09 ENCOUNTER — TELEPHONE (OUTPATIENT)
Age: 74
End: 2024-09-09

## 2024-09-09 NOTE — TELEPHONE ENCOUNTER
Called out to patient to reschedule apt, patient did not answer. So I left message on phone to give us a call back

## 2024-09-17 ENCOUNTER — HOSPITAL ENCOUNTER (OUTPATIENT)
Dept: OTHER | Age: 74
Setting detail: THERAPIES SERIES
Discharge: HOME | End: 2024-09-17

## 2024-10-01 ENCOUNTER — HOSPITAL ENCOUNTER (OUTPATIENT)
Dept: OTHER | Age: 74
Setting detail: THERAPIES SERIES
Discharge: HOME | End: 2024-10-01
Payer: MEDICARE

## 2024-10-01 VITALS
BODY MASS INDEX: 28.55 KG/M2 | SYSTOLIC BLOOD PRESSURE: 106 MMHG | DIASTOLIC BLOOD PRESSURE: 59 MMHG | RESPIRATION RATE: 17 BRPM | WEIGHT: 199 LBS | HEART RATE: 78 BPM | OXYGEN SATURATION: 91 %

## 2024-10-01 LAB
ANION GAP SERPL CALCULATED.3IONS-SCNC: 10 MMOL/L (ref 7–16)
BNP SERPL-MCNC: 2072 PG/ML (ref 0–450)
BUN SERPL-MCNC: 28 MG/DL (ref 6–23)
CALCIUM SERPL-MCNC: 9.7 MG/DL (ref 8.6–10.2)
CHLORIDE SERPL-SCNC: 105 MMOL/L (ref 98–107)
CO2 SERPL-SCNC: 29 MMOL/L (ref 22–29)
CREAT SERPL-MCNC: 1.6 MG/DL (ref 0.7–1.2)
GFR, ESTIMATED: 46 ML/MIN/1.73M2
GLUCOSE SERPL-MCNC: 153 MG/DL (ref 74–99)
POTASSIUM SERPL-SCNC: 3.5 MMOL/L (ref 3.5–5)
SODIUM SERPL-SCNC: 144 MMOL/L (ref 132–146)

## 2024-10-01 PROCEDURE — 99214 OFFICE O/P EST MOD 30 MIN: CPT

## 2024-10-01 PROCEDURE — 96374 THER/PROPH/DIAG INJ IV PUSH: CPT

## 2024-10-01 PROCEDURE — 83880 ASSAY OF NATRIURETIC PEPTIDE: CPT

## 2024-10-01 PROCEDURE — 80048 BASIC METABOLIC PNL TOTAL CA: CPT

## 2024-10-01 PROCEDURE — 6360000002 HC RX W HCPCS: Performed by: INTERNAL MEDICINE

## 2024-10-01 PROCEDURE — 36415 COLL VENOUS BLD VENIPUNCTURE: CPT

## 2024-10-01 PROCEDURE — 2580000003 HC RX 258: Performed by: INTERNAL MEDICINE

## 2024-10-01 RX ORDER — SODIUM CHLORIDE 0.9 % (FLUSH) 0.9 %
10 SYRINGE (ML) INJECTION PRN
Status: DISCONTINUED | OUTPATIENT
Start: 2024-10-01 | End: 2024-10-02 | Stop reason: HOSPADM

## 2024-10-01 RX ORDER — FUROSEMIDE 10 MG/ML
40 INJECTION INTRAMUSCULAR; INTRAVENOUS ONCE
Status: COMPLETED | OUTPATIENT
Start: 2024-10-01 | End: 2024-10-01

## 2024-10-01 RX ORDER — DAPAGLIFLOZIN 10 MG/1
10 TABLET, FILM COATED ORAL EVERY MORNING
Qty: 90 TABLET | Refills: 0 | Status: SHIPPED | OUTPATIENT
Start: 2024-10-01

## 2024-10-01 RX ADMIN — SODIUM CHLORIDE, PRESERVATIVE FREE 10 ML: 5 INJECTION INTRAVENOUS at 09:50

## 2024-10-01 RX ADMIN — FUROSEMIDE 40 MG: 10 INJECTION, SOLUTION INTRAMUSCULAR; INTRAVENOUS at 09:51

## 2024-10-01 ASSESSMENT — PATIENT HEALTH QUESTIONNAIRE - PHQ9
SUM OF ALL RESPONSES TO PHQ9 QUESTIONS 1 & 2: 0
SUM OF ALL RESPONSES TO PHQ QUESTIONS 1-9: 0
SUM OF ALL RESPONSES TO PHQ QUESTIONS 1-9: 0
1. LITTLE INTEREST OR PLEASURE IN DOING THINGS: NOT AT ALL
SUM OF ALL RESPONSES TO PHQ QUESTIONS 1-9: 0
SUM OF ALL RESPONSES TO PHQ QUESTIONS 1-9: 0

## 2024-10-01 NOTE — RESULT ENCOUNTER NOTE
CHF clinic visit and labs reviewed  Hypervolemic on RN exam, Lasix increased yesterday by PCP with good urinary response   CXR was also done due to increased dyspnea, results not available     BNP 3740>>>2072  Renal function stable  Potassium 3.5     Add Jardiance 10 mg daily: Krystina, can you please check for preauth?   Increase KlorCon to 20 meq daily while taking additional Lasix this week

## 2024-10-01 NOTE — PROGRESS NOTES
Date Value   09/03/2024 27     Total Bilirubin (mg/dL)   Date Value   09/03/2024 0.3     Alkaline Phosphatase (U/L)   Date Value   09/03/2024 82     INR:  INR (no units)   Date Value   09/03/2024 3.9         Wt Readings from Last 3 Encounters:   10/01/24 90.3 kg (199 lb)   09/03/24 88.8 kg (195 lb 12.8 oz)   08/05/24 90 kg (198 lb 6.4 oz)           ASSESSMENT/PLAN:    [] Euvolemic          [x] Hypervolemic, with increase from baseline:  [x] Shortness of breath/CALVO   [] JVD  [] HJR  [x] Abnormal lung assessment: Diminished   [x] Orthopnea  [] PND  [] Decreased urinary response to oral diuretic   [] Scrotal swelling   [] Lower extremity edema  [] Compression stockings provided  [] Decline in functional capacity (unable to perform activities they had previously been able to do)  [] Weight gain     [x] IV diuretics given IV LASIX 40 mg  [] Provider notified of recurrent IV diuretic use    Additional Notes: Patient presented to the CHF clinic for his first appointment. He is complaining of difficulty breathing while laying flat. He just saw his PCP yesterday and requested a chest X-ray, that was completed at [facility]. He is complaining of more SOB on exertion.     His PCP instructed him to increase his lasix to 40 mg BID and follow back up in the office this Friday. He only took 1 extra dose of his lasix thus far but states he had a great urinary response when doing so.     Updated order from Janice Kenney APRN-CNS:  Add Farxiga 10 mg daily   Increase KlorCon to 20 meq daily while taking additional Lasix this week   Follow up labs one week after starting Farxiga     I called and spoke to Ronnie and he verbalizes understanding     [x]Lab work obtained    [x] Patient/Family Educated On:  [x] HF zones (Green, Yellow, Red) and aware of when to take action   [x] Daily weights  [] Scale provided   [x] Low sodium diet (2000 mg)  Barriers to compliance  [] Refuses to monitor diet  [] Socioeconomic difficulties  []

## 2024-10-02 ENCOUNTER — TELEPHONE (OUTPATIENT)
Dept: OTHER | Age: 74
End: 2024-10-02

## 2024-10-02 NOTE — TELEPHONE ENCOUNTER
CHW called Pharmacy to check Jardiance prior auth/ co pay. The co pay is $525 for 90 days. I checked Farxiga and it is $33 for 90 days.

## 2024-10-02 NOTE — TELEPHONE ENCOUNTER
Patient called into the CHF Clinic to clarify orders.  Start Jardiance 10 mg daily and get BMP at any [facility] facility in one week  Increase KlorCon to 20 meq daily while taking additional Lasix this week.    Patient repeated instructions back to me and verbalized an understanding.

## 2024-10-09 ENCOUNTER — TELEPHONE (OUTPATIENT)
Dept: OTHER | Age: 74
End: 2024-10-09

## 2024-10-09 ENCOUNTER — HOSPITAL ENCOUNTER (OUTPATIENT)
Age: 74
Discharge: HOME | End: 2024-10-09
Payer: MEDICARE

## 2024-10-09 DIAGNOSIS — I50.9 ACUTE DECOMPENSATED HEART FAILURE (HCC): ICD-10-CM

## 2024-10-09 DIAGNOSIS — I50.9 ACUTE DECOMPENSATED HEART FAILURE (HCC): Primary | ICD-10-CM

## 2024-10-09 LAB
ANION GAP SERPL CALCULATED.3IONS-SCNC: 7 MMOL/L (ref 7–16)
BUN SERPL-MCNC: 38 MG/DL (ref 6–23)
CALCIUM SERPL-MCNC: 9.5 MG/DL (ref 8.6–10.2)
CHLORIDE SERPL-SCNC: 106 MMOL/L (ref 98–107)
CO2 SERPL-SCNC: 31 MMOL/L (ref 22–29)
CREAT SERPL-MCNC: 1.6 MG/DL (ref 0.7–1.2)
GFR, ESTIMATED: 44 ML/MIN/1.73M2
GLUCOSE SERPL-MCNC: 101 MG/DL (ref 74–99)
POTASSIUM SERPL-SCNC: 4.5 MMOL/L (ref 3.5–5)
SODIUM SERPL-SCNC: 144 MMOL/L (ref 132–146)

## 2024-10-09 PROCEDURE — 36415 COLL VENOUS BLD VENIPUNCTURE: CPT

## 2024-10-09 PROCEDURE — 80048 BASIC METABOLIC PNL TOTAL CA: CPT

## 2024-10-09 NOTE — TELEPHONE ENCOUNTER
10/1 New orders from Janice HITCHCOCK-CNS.       Patient called into the CHF Clinic to clarify orders.  Start Jardiance 10 mg daily and get BMP at any [facility] facility in one week  Increase KlorCon to 20 meq daily while taking additional Lasix this week.     Patient repeated instructions back to me and verbalized an understanding.       11:00 AM 10/9/2024 Registration called re: patient is here for labs. BMP placed in epic.

## 2024-10-09 NOTE — TELEPHONE ENCOUNTER
----- Message from EVERTON Mancini - CNS sent at 10/9/2024  1:28 PM EDT -----  Please let Mr. Molina know that his kidney function is stable after the addition of Farxiga  Decrease Lasix back to 40 mg daily  Follow-up as scheduled

## 2024-10-09 NOTE — TELEPHONE ENCOUNTER
----- Message from EVERTON Mancini sent at 10/9/2024  3:30 PM EDT -----  Regarding: RE: potassium dose  The 10 meq please.  Thanks  ----- Message -----  From: Aixa Santiago RN  Sent: 10/9/2024   2:24 PM EDT  To: EVERTON Hennessy - CNS  Subject: potassium dose                                   Ronnie Molina 1950     Called patient re: new instructions. He was taking potassium 20mequ daily with the increase in Lasix 40m g BID. Do you want him to continue taking 20mequ daily or 10mequ like he previous was?    Janice Kenney, EVERTON - CNS  Aixa Santiago RN  Please let Mr. Molina know that his kidney function is stable after the addition of Farxiga  Decrease Lasix back to 40 mg daily  Follow-up as scheduled    Thank you

## 2024-10-09 NOTE — TELEPHONE ENCOUNTER
2:21 PM 10/9/2024 Called patient re: new medication changes. I have reviewed the provider's instructions with the patient, answering all questions to his satisfaction.            Future Appointments   Date Time Provider Department Center   10/15/2024  9:45 AM [facility] CHF ROOM 2 [facility]        Statement Selected

## 2024-10-09 NOTE — RESULT ENCOUNTER NOTE
Please let Tom Jesse know that his kidney function is stable after the addition of Farxiga  Decrease Lasix back to 40 mg daily  Follow-up as scheduled

## 2024-10-09 NOTE — TELEPHONE ENCOUNTER
3:33 PM 10/9/2024 patient is notified. I have reviewed the provider's instructions with the patient, answering all questions to his satisfaction.

## 2024-10-14 ENCOUNTER — TELEPHONE (OUTPATIENT)
Dept: OTHER | Age: 74
End: 2024-10-14

## 2024-10-14 NOTE — TELEPHONE ENCOUNTER
9:28 AM 10/14/2024 Cancelled CHF clinic for tomorrow. Pt states he is wishes to follow with [facility] and PCP at this time and does not wish to reschedule. He will call back if he changes his mind.         
- - -

## 2025-06-18 ENCOUNTER — HOSPITAL ENCOUNTER (EMERGENCY)
Age: 75
Discharge: HOME | End: 2025-06-18
Attending: STUDENT IN AN ORGANIZED HEALTH CARE EDUCATION/TRAINING PROGRAM
Payer: MEDICARE

## 2025-06-18 ENCOUNTER — APPOINTMENT (OUTPATIENT)
Dept: GENERAL RADIOLOGY | Age: 75
End: 2025-06-18
Payer: MEDICARE

## 2025-06-18 VITALS
OXYGEN SATURATION: 92 % | RESPIRATION RATE: 18 BRPM | TEMPERATURE: 97 F | SYSTOLIC BLOOD PRESSURE: 112 MMHG | HEART RATE: 54 BPM | DIASTOLIC BLOOD PRESSURE: 60 MMHG

## 2025-06-18 DIAGNOSIS — J44.1 COPD EXACERBATION (HCC): Primary | ICD-10-CM

## 2025-06-18 DIAGNOSIS — R06.02 SHORTNESS OF BREATH: ICD-10-CM

## 2025-06-18 LAB
ALBUMIN SERPL-MCNC: 3.9 G/DL (ref 3.5–5.2)
ALP SERPL-CCNC: 90 U/L (ref 40–129)
ALT SERPL-CCNC: 24 U/L (ref 0–50)
ANION GAP SERPL CALCULATED.3IONS-SCNC: 10 MMOL/L (ref 7–16)
AST SERPL-CCNC: 19 U/L (ref 0–50)
BASOPHILS # BLD: 0.04 K/UL (ref 0–0.2)
BASOPHILS NFR BLD: 0 % (ref 0–2)
BILIRUB DIRECT SERPL-MCNC: 0.2 MG/DL (ref 0–0.2)
BILIRUB INDIRECT SERPL-MCNC: 0.3 MG/DL (ref 0–1)
BILIRUB SERPL-MCNC: 0.5 MG/DL (ref 0–1.2)
BNP SERPL-MCNC: 2861 PG/ML (ref 0–450)
BUN SERPL-MCNC: 34 MG/DL (ref 8–23)
CALCIUM SERPL-MCNC: 9 MG/DL (ref 8.8–10.2)
CHLORIDE SERPL-SCNC: 103 MMOL/L (ref 98–107)
CO2 SERPL-SCNC: 26 MMOL/L (ref 22–29)
CREAT SERPL-MCNC: 1.6 MG/DL (ref 0.7–1.2)
EOSINOPHIL # BLD: 0.14 K/UL (ref 0.05–0.5)
EOSINOPHILS RELATIVE PERCENT: 1 % (ref 0–6)
ERYTHROCYTE [DISTWIDTH] IN BLOOD BY AUTOMATED COUNT: 13.6 % (ref 11.5–15)
GFR, ESTIMATED: 45 ML/MIN/1.73M2
GLUCOSE SERPL-MCNC: 101 MG/DL (ref 74–99)
HCT VFR BLD AUTO: 47.2 % (ref 37–54)
HGB BLD-MCNC: 16 G/DL (ref 12.5–16.5)
IMM GRANULOCYTES # BLD AUTO: 0.05 K/UL (ref 0–0.58)
IMM GRANULOCYTES NFR BLD: 0 % (ref 0–5)
INR PPP: 3.2
LYMPHOCYTES NFR BLD: 1.83 K/UL (ref 1.5–4)
LYMPHOCYTES RELATIVE PERCENT: 15 % (ref 20–42)
MCH RBC QN AUTO: 33.4 PG (ref 26–35)
MCHC RBC AUTO-ENTMCNC: 33.9 G/DL (ref 32–34.5)
MCV RBC AUTO: 98.5 FL (ref 80–99.9)
MONOCYTES NFR BLD: 0.97 K/UL (ref 0.1–0.95)
MONOCYTES NFR BLD: 8 % (ref 2–12)
NEUTROPHILS NFR BLD: 75 % (ref 43–80)
NEUTS SEG NFR BLD: 8.82 K/UL (ref 1.8–7.3)
PLATELET # BLD AUTO: 163 K/UL (ref 130–450)
PMV BLD AUTO: 10.3 FL (ref 7–12)
POTASSIUM SERPL-SCNC: 4.3 MMOL/L (ref 3.5–5.1)
PROT SERPL-MCNC: 6.8 G/DL (ref 6.4–8.3)
PROTHROMBIN TIME: 35.4 SEC (ref 9.3–12.4)
RBC # BLD AUTO: 4.79 M/UL (ref 3.8–5.8)
SODIUM SERPL-SCNC: 139 MMOL/L (ref 136–145)
TROPONIN I SERPL HS-MCNC: 25 NG/L (ref 0–22)
TROPONIN I SERPL HS-MCNC: 27 NG/L (ref 0–22)
WBC OTHER # BLD: 11.9 K/UL (ref 4.5–11.5)

## 2025-06-18 PROCEDURE — 84484 ASSAY OF TROPONIN QUANT: CPT

## 2025-06-18 PROCEDURE — 6360000002 HC RX W HCPCS: Performed by: STUDENT IN AN ORGANIZED HEALTH CARE EDUCATION/TRAINING PROGRAM

## 2025-06-18 PROCEDURE — 85025 COMPLETE CBC W/AUTO DIFF WBC: CPT

## 2025-06-18 PROCEDURE — 93005 ELECTROCARDIOGRAM TRACING: CPT | Performed by: STUDENT IN AN ORGANIZED HEALTH CARE EDUCATION/TRAINING PROGRAM

## 2025-06-18 PROCEDURE — 80053 COMPREHEN METABOLIC PANEL: CPT

## 2025-06-18 PROCEDURE — 71046 X-RAY EXAM CHEST 2 VIEWS: CPT

## 2025-06-18 PROCEDURE — 99285 EMERGENCY DEPT VISIT HI MDM: CPT

## 2025-06-18 PROCEDURE — 82248 BILIRUBIN DIRECT: CPT

## 2025-06-18 PROCEDURE — 96374 THER/PROPH/DIAG INJ IV PUSH: CPT

## 2025-06-18 PROCEDURE — 85610 PROTHROMBIN TIME: CPT

## 2025-06-18 PROCEDURE — 6370000000 HC RX 637 (ALT 250 FOR IP): Performed by: STUDENT IN AN ORGANIZED HEALTH CARE EDUCATION/TRAINING PROGRAM

## 2025-06-18 PROCEDURE — 94640 AIRWAY INHALATION TREATMENT: CPT

## 2025-06-18 PROCEDURE — 83880 ASSAY OF NATRIURETIC PEPTIDE: CPT

## 2025-06-18 RX ORDER — DOXYCYCLINE HYCLATE 100 MG
100 TABLET ORAL 2 TIMES DAILY
Qty: 10 TABLET | Refills: 0 | Status: SHIPPED | OUTPATIENT
Start: 2025-06-18 | End: 2025-06-23

## 2025-06-18 RX ORDER — LOSARTAN POTASSIUM 25 MG/1
25 TABLET ORAL DAILY
COMMUNITY
Start: 2025-06-18

## 2025-06-18 RX ORDER — DEXAMETHASONE SODIUM PHOSPHATE 10 MG/ML
10 INJECTION, SOLUTION INTRA-ARTICULAR; INTRALESIONAL; INTRAMUSCULAR; INTRAVENOUS; SOFT TISSUE ONCE
Status: COMPLETED | OUTPATIENT
Start: 2025-06-18 | End: 2025-06-18

## 2025-06-18 RX ORDER — PREDNISONE 50 MG/1
50 TABLET ORAL DAILY
Qty: 5 TABLET | Refills: 0 | Status: SHIPPED | OUTPATIENT
Start: 2025-06-18 | End: 2025-06-23

## 2025-06-18 RX ORDER — IPRATROPIUM BROMIDE AND ALBUTEROL SULFATE 2.5; .5 MG/3ML; MG/3ML
1 SOLUTION RESPIRATORY (INHALATION)
Status: COMPLETED | OUTPATIENT
Start: 2025-06-18 | End: 2025-06-18

## 2025-06-18 RX ADMIN — IPRATROPIUM BROMIDE AND ALBUTEROL SULFATE 1 DOSE: 2.5; .5 SOLUTION RESPIRATORY (INHALATION) at 18:00

## 2025-06-18 RX ADMIN — IPRATROPIUM BROMIDE AND ALBUTEROL SULFATE 1 DOSE: 2.5; .5 SOLUTION RESPIRATORY (INHALATION) at 18:18

## 2025-06-18 RX ADMIN — IPRATROPIUM BROMIDE AND ALBUTEROL SULFATE 1 DOSE: 2.5; .5 SOLUTION RESPIRATORY (INHALATION) at 17:45

## 2025-06-18 RX ADMIN — DEXAMETHASONE SODIUM PHOSPHATE 10 MG: 10 INJECTION INTRAMUSCULAR; INTRAVENOUS at 17:50

## 2025-06-18 NOTE — ED PROVIDER NOTES
[facility] EMERGENCY DEPARTMENT  EMERGENCY DEPARTMENT ENCOUNTER        Pt Name: Ronnie Molina  MRN: [id number]  Birthdate 1950  Date of evaluation: 6/18/2025  Provider: Cortez Lazo MD  PCP: Renaldo Sandhu MD  Note Started: 5:22 PM EDT 6/18/25    CHIEF COMPLAINT       Chief Complaint   Patient presents with    Shortness of Breath     SOB starting awhile ago. Was put on a new medication for the SOB and it stopped working about a month ago. Hx CHF, no CP       HISTORY OF PRESENT ILLNESS: 1 or more Elements        Limitations to history : None    Ronnie Molina is a 75 y.o. male who presents to the emergency room for shortness of breath.  States he recently had a sinus infection, and his shortness of breath seems to be the worst in the morning, but improves throughout the day as he takes his water pill.  Has not noted any increased leg swelling or weight gain.  No chest pain.  No fevers or chills.  No nausea or vomiting.    Nursing Notes were all reviewed and agreed with or any disagreements were addressed in the HPI.      REVIEW OF EXTERNAL NOTE :       Admission 9/1/2024 patient was admitted for acute hypoxic respiratory failure, decompensated heart failure    REVIEW OF SYSTEMS :      Positives and Pertinent negatives as per HPI.     SURGICAL HISTORY     Past Surgical History:   Procedure Laterality Date    ABDOMINAL AORTIC ANEURYSM REPAIR  2014    Dr. Gunderson [facility]    CARDIAC CATHETERIZATION  4/17/2014    By Dr. Ashby    CHOLECYSTECTOMY, LAPAROSCOPIC N/A 8/4/2024    CHOLECYSTECTOMY LAPAROSCOPIC performed by Nav Crenshaw MD at [facility] OR    COLONOSCOPY  2021    INNER EAR SURGERY  1995    INNER EAR SURGERY Right 11/1/2021    EAR TUBE REMOVAL-RIGHT. CANCELLED AFTER INDUCTION performed by Ernestina Freeman MD at [facility] OR    PACEMAKER PLACEMENT      [employer]    PILONIDAL CYST EXCISION  1970's-80's    TYMPANOPLASTY Left 11/1/2021    LEFT TYMPANOPLASTY POSSIBLE TYMPANOPLASTY

## 2025-06-19 LAB
EKG ATRIAL RATE: 50 BPM
EKG P-R INTERVAL: 334 MS
EKG Q-T INTERVAL: 498 MS
EKG QRS DURATION: 88 MS
EKG QTC CALCULATION (BAZETT): 449 MS
EKG R AXIS: 0 DEGREES
EKG T AXIS: 268 DEGREES
EKG VENTRICULAR RATE: 49 BPM

## 2025-06-19 PROCEDURE — 93010 ELECTROCARDIOGRAM REPORT: CPT | Performed by: INTERNAL MEDICINE

## (undated) DEVICE — SET INSUF TUBE HEAT ISO CONN DISP

## (undated) DEVICE — TUBING, SUCTION, 3/16" X 12', STRAIGHT: Brand: MEDLINE

## (undated) DEVICE — TROCAR: Brand: KII SLEEVE

## (undated) DEVICE — CAMERA STRYKER 1488 HD GEN

## (undated) DEVICE — FORCEPS ADSON

## (undated) DEVICE — FORCEPS DEBAKEY MED

## (undated) DEVICE — SYRINGE, LUER LOCK, 10ML: Brand: MEDLINE

## (undated) DEVICE — 4-PORT MANIFOLD: Brand: NEPTUNE 2

## (undated) DEVICE — PAD,NON-ADHERENT,3X8,STERILE,LF,1/PK: Brand: MEDLINE

## (undated) DEVICE — 1000 S-DRAPE TOWEL DRAPE 10/BX: Brand: STERI-DRAPE™

## (undated) DEVICE — Device

## (undated) DEVICE — LIQUIBAND RAPID ADHESIVE 36/CS 0.8ML: Brand: MEDLINE

## (undated) DEVICE — PACK PROCEDURE SURG GEN CUST

## (undated) DEVICE — COVER,LIGHT HANDLE,FLX,1/PK: Brand: MEDLINE INDUSTRIES, INC.

## (undated) DEVICE — GLOVE SURG SZ 55 CRM LTX FREE POLYISOPRENE POLYMER BEAD ANTI

## (undated) DEVICE — STRIP,CLOSURE,WOUND,MEDI-STRIP,1/4X3: Brand: MEDLINE

## (undated) DEVICE — APPLICATOR MEDICATED 26 CC SOLUTION HI LT ORNG CHLORAPREP

## (undated) DEVICE — ANCHOR TISSUE RETRIEVAL SYSTEM, BAG SIZE 175 ML, PORT SIZE 10 MM: Brand: ANCHOR TISSUE RETRIEVAL SYSTEM

## (undated) DEVICE — GARMENT,MEDLINE,DVT,INT,CALF,MED, GEN2: Brand: MEDLINE

## (undated) DEVICE — TOWEL,OR,DSP,ST,BLUE,STD,6/PK,12PK/CS: Brand: MEDLINE

## (undated) DEVICE — DRAPE,REIN 53X77,STERILE: Brand: MEDLINE

## (undated) DEVICE — CATHETER IV 18 GAX1.25 IN WNG INTROCAN SAFETY

## (undated) DEVICE — SYRINGE 20ML LL S/C 50

## (undated) DEVICE — KIT,ANTI FOG,W/SPONGE & FLUID,SOFT PACK: Brand: MEDLINE

## (undated) DEVICE — GOWN,SIRUS,NONRNF,SETINSLV,XL,20/CS: Brand: MEDLINE

## (undated) DEVICE — INSTRUMENT SKIN HOOKS REUSABLE

## (undated) DEVICE — SPONGE GZ W4XL4IN RAYON POLY FILL CVR W/ NONWOVEN FAB

## (undated) DEVICE — BASIC DOUBLE BASIN 2-LF: Brand: MEDLINE INDUSTRIES, INC.

## (undated) DEVICE — WIPES SKIN CLOTH READYPREP 9 X 10.5 IN 2% CHLORHEX GLUCONATE CHG PREOP

## (undated) DEVICE — SYRINGE MED 3ML CLR PLAS STD N CTRL LUERLOCK TIP DISP

## (undated) DEVICE — MEDIA CONTRAST ISOVUE 250 100ML

## (undated) DEVICE — CORD,CAUTERY,BIPOLAR,STERILE: Brand: MEDLINE

## (undated) DEVICE — PACK SURG LAP CHOLE CUSTOM

## (undated) DEVICE — MASTISOL ADHESIVE LIQ 2/3ML

## (undated) DEVICE — POUCH FLD COLL W/ DRNGE PRT N LINTING TEAR RESIST MOLD STRP

## (undated) DEVICE — INSUFFLATION NEEDLE TO ESTABLISH PNEUMOPERITONEUM.: Brand: INSUFFLATION NEEDLE

## (undated) DEVICE — DOUBLE BASIN SET: Brand: MEDLINE INDUSTRIES, INC.

## (undated) DEVICE — LAPAROSCOPIC SCISSORS: Brand: EPIX LAPAROSCOPIC SCISSORS

## (undated) DEVICE — MARKER,SKIN,WI/RULER AND LABELS: Brand: MEDLINE

## (undated) DEVICE — TROCAR: Brand: KII FIOS FIRST ENTRY

## (undated) DEVICE — NEEDLE HYPO 25GA L1.5IN BLU POLYPR HUB S STL REG BVL STR

## (undated) DEVICE — GLOVE ORANGE PI 7 1/2   MSG9075

## (undated) DEVICE — SYRINGE MED 10ML TRNSLUC BRL PLUNG BLK MRK POLYPR CTRL

## (undated) DEVICE — SOLUTION IV IRRIG WATER 1000ML POUR BRL 2F7114

## (undated) DEVICE — NEEDLE FLTR 18GA L1.5IN MEM THK5UM BLNT DISP

## (undated) DEVICE — AGENT HEMSTAT W2XL4IN OXIDIZED REGENERATED CELOS ABSRB

## (undated) DEVICE — SYRINGE MED 10ML LUERLOCK TIP W/O SFTY DISP

## (undated) DEVICE — STERILE PVP: Brand: MEDLINE INDUSTRIES, INC.

## (undated) DEVICE — SURGICAL PROCEDURE PACK EENT CUST

## (undated) DEVICE — LAPAROSCOPIC WIRE L HK 45 CM

## (undated) DEVICE — GOWN,SIRUS,FABRNF,L,20/CS: Brand: MEDLINE

## (undated) DEVICE — REUSABLE BIPOLAR FORCEPS CABLE: Brand: SILVERGLIDE

## (undated) DEVICE — DRAPE C ARM W41XL65IN UNIV W/ CLP AND RUBBERBAND

## (undated) DEVICE — COOK ENDOSCOPIC CHOLANGIOGRAPHY SET: Brand: COOK

## (undated) DEVICE — SOLUTION IV IRRIG POUR BRL 0.9% SODIUM CHL 2F7124

## (undated) DEVICE — KIT SURG PREP POVIDONE IOD PRESATURATED PAINT WET FOR UNIV

## (undated) DEVICE — STANDARD HYPODERMIC NEEDLE,ALUMINUM HUB: Brand: MONOJECT

## (undated) DEVICE — SET SURG BASIN MAYO REUSABLE

## (undated) DEVICE — SET EAR HOUSE

## (undated) DEVICE — 3M™ STERI-DRAPE™ INCISE DRAPE 1040 (35CM X 35CM): Brand: STERI-DRAPE™

## (undated) DEVICE — SPECIMEN CUP W/LID: Brand: DEROYAL